# Patient Record
Sex: FEMALE | Race: WHITE | NOT HISPANIC OR LATINO | Employment: OTHER | URBAN - METROPOLITAN AREA
[De-identification: names, ages, dates, MRNs, and addresses within clinical notes are randomized per-mention and may not be internally consistent; named-entity substitution may affect disease eponyms.]

---

## 2023-09-18 PROBLEM — E11.40 TYPE 2 DIABETES MELLITUS WITH DIABETIC NEUROPATHY, WITHOUT LONG-TERM CURRENT USE OF INSULIN (H): Status: ACTIVE | Noted: 2018-03-04

## 2023-09-18 PROBLEM — S72.142A: Status: ACTIVE | Noted: 2023-09-09

## 2023-09-18 PROBLEM — S22.000A THORACIC COMPRESSION FRACTURE (H): Status: ACTIVE | Noted: 2023-01-01

## 2023-09-18 PROBLEM — R60.0 BILATERAL LOWER EXTREMITY EDEMA: Status: ACTIVE | Noted: 2019-09-19

## 2023-09-18 PROBLEM — R55 SITUATIONAL SYNCOPE: Status: ACTIVE | Noted: 2023-09-18

## 2023-09-18 PROBLEM — I25.10 CORONARY ATHEROSCLEROSIS: Status: ACTIVE | Noted: 2023-01-01

## 2023-09-18 PROBLEM — G90.3 NEUROGENIC ORTHOSTATIC HYPOTENSION (H): Status: ACTIVE | Noted: 2023-09-18

## 2023-09-18 PROBLEM — R29.898 SEVERE MUSCLE DECONDITIONING: Status: ACTIVE | Noted: 2023-09-18

## 2023-09-18 PROBLEM — G47.33 OSA TREATED WITH BIPAP: Status: ACTIVE | Noted: 2023-09-18

## 2023-09-18 PROBLEM — E66.9 OBESITY (BMI 30-39.9): Status: ACTIVE | Noted: 2023-09-18

## 2023-09-18 PROBLEM — S72.002A CLOSED LEFT HIP FRACTURE (H): Status: ACTIVE | Noted: 2023-09-09

## 2023-09-18 PROBLEM — T85.528A DISPLACEMENT OF PANCREATIC STENT: Status: ACTIVE | Noted: 2023-09-13

## 2023-09-18 PROBLEM — K58.1 IRRITABLE BOWEL SYNDROME WITH CONSTIPATION: Status: ACTIVE | Noted: 2023-09-18

## 2023-09-18 NOTE — PROGRESS NOTES
[facility] GERIATRICS  INITIAL VISIT NOTE  September 19, 2023    PRIMARY CARE PROVIDER AND CLINIC: MaicoCici [address] [facility] / [facility] [address]    [facility] Medical Record Number: [id number]  Place of Service where encounter took place: [facility] ([facility]) [[id number]]    Chief Complaint   Patient presents with    Hospital F/U     [facility] 9/9/2023 - 9/18/2023     HPI:    Malina Miller is a 89 year old (8/10/1932) female was admitted to the above facility from [facility] [place] . Hospital stay 9/9/23 through 9/18/23 where they were admitted for fall with left femur fracture. Now admitted to this facility for rehab, medical management, and nursing care.      History obtained from: facility chart records, facility staff, patient report, [facility] chart review, and Care Everywhere [facility] chart review.      Brief Hospital Course: PMH of hypothyroidism, neuropathy, DM2, RAJANI, CAD, HTN,  who presented after a fall. Found to have left intertrochanteric femur fracture. S/p nailing on 9/10/23. Had significant orthostatic hypotension, all Lasix, losartan, amlodipine stopped and she was started on midodrine. Narcotics stopped. Started on salt tabs for hyponatremia. Had episode of syncope after a BM, no further episodes. Had post-op anemia, was transfused 2 units PRBC and was started on Foltx. Glipizide discontinued. Was noted to be significantly deconditioned and TCU was recommended. When medically stable was discharged to U for further rehab and medical management.       TCU Course: Seen today resting in bed. She reports she has pain in her hip, but it is tolerable. She did not tolerate narcotics in the hospital. Is hoping she can discharge soon, but states she knows she is 89 and her  is 89, so she can't rush home. Admits they may need increased help at home. Her daughters are involved and supportive. She is currently using an EZ stand for transfers. Reports chronic issues with  "constipation. Denies any chest pain or SOB. Has not had any lightheadedness in TCU.       CODE STATUS/ADVANCE DIRECTIVES: DNR / DNI    ALLERGIES:  Allergies   Allergen Reactions    Metformin Diarrhea    Spironolactone Headache     Hyponatremia       PAST MEDICAL HISTORY:   No past medical history on file.  PAST SURGICAL HISTORY:   No past surgical history on file.  FAMILY HISTORY:   No family history on file.      SOCIAL HISTORY:   Patient's living condition: lives with spouse    MEDICATIONS  Post Discharge Medication Reconciliation Status: discharge medications reconciled and changed, per note/orders.  Current Outpatient Medications   Medication Sig Dispense Refill    acetaminophen (TYLENOL) 500 MG tablet Take 1,000 mg by mouth 3 times daily      aspirin 81 MG EC tablet Take 81 mg by mouth 2 times daily (with meals)      atorvastatin (LIPITOR) 10 MG tablet Take 10 mg by mouth daily      carboxymethylcellulose PF (REFRESH PLUS) 0.5 % ophthalmic solution Place 1 drop into both eyes 4 times daily as needed for dry eyes      Folic Acid-Vit B6-Vit B12 2.3-24.5-2 MG TABS Take 1 tablet by mouth daily      levothyroxine (SYNTHROID/LEVOTHROID) 112 MCG tablet Take 112 mcg by mouth daily      Lidocaine (LIDOCARE) 4 % Patch Place 1 patch onto the skin every 24 hours To prevent lidocaine toxicity, patient should be patch free for 12 hrs daily.      melatonin 3 MG tablet Take 3 mg by mouth At Bedtime      midodrine (PROAMATINE) 10 MG tablet Take 20 mg by mouth 2 times daily      pantoprazole (PROTONIX) 40 MG EC tablet Take 40 mg by mouth daily      sodium chloride 1 GM tablet Take 1 g by mouth 2 times daily       ROS:  10 point ROS neg other than the symptoms noted above in the HPI.      PHYSICAL EXAM:  /50   Pulse 61   Temp 97.8  F (36.6  C)   Resp 19   Ht 1.626 m (5' 4\")   SpO2 98%   Physical Exam  Cardiovascular:      Rate and Rhythm: Normal rate and regular rhythm.      Heart sounds: Normal heart sounds. "   Pulmonary:      Effort: Pulmonary effort is normal.      Breath sounds: Normal breath sounds.   Musculoskeletal:      Left lower leg: Edema present.      Comments: Decreased ROM to left hip,    Neurological:      Mental Status: She is alert and oriented to person, place, and time.   Psychiatric:         Mood and Affect: Mood normal.         Thought Content: Thought content normal.          LABORATORY/IMAGING DATA:  Reviewed as per [facility] and/or [facility]    ASSESSMENT/PLAN:  Aftercare following surgery of the musculoskeletal system  Closed 2-part intertrochanteric fracture of left femur with routine healing, subsequent encounter  Physical deconditioning  Secondary to fall. Surgery without complication. Did not tolerate narcotics as they caused confusion. Reports pain as tolerable on visit today.  Discussed with patient, nursing staff, therapy  - analgesia with APAP TID, lidocaine patches  - PT/OT  - DVT ppx with ASA 81mg BID x 6 weeks   - follow-up with ortho in 2 weeks     ABLA (acute blood loss anemia)  Hgb trace 6.6. Received 2 units PRBC, hgb stable ~9,  No s/s of bleeding in TCU  - Hgb PRN    Neurogenic orthostatic hypotension (H)  Orthostatic hypotension  Primary hypertension  Significant, amlodipine, lasix, and losartan discontinued. Limited data to trend BP in TCU.  Discussed with patient, nursing staff.   - continue midodrine 20mg BID   - monitor and adjust       Pure hypercholesterolemia  - continue atorvastatin    Irritable bowel syndrome with constipation  Chronic issues with constipation, manages with fiber at home, would like to try fiber supplement in TCU.   - psyllium (METAMUCIL/KONSYL) 58.6 % powder; Take 6 g (1 teaspoonful) by mouth daily    Hypothyroidism, unspecified type  TSH 0.22,   - continue levothyroxine    Type 2 diabetes mellitus with diabetic neuropathy, without long-term current use of insulin (H)  A1C 5.5% 7/2023. Glipizide stopped while IP.   - monitor BG   - daily BG checks      RAJANI treated with BiPAP  - CPAP at home settings.     Peripheral polyneuropathy  - APAP,   - monitor and adjust       Orders:   Psyllium powder 1 teaspoonful po daily for constipation  BMP on 9/25     Total time spent with patient visit at the skilled nursing facility was 50 minutes including patient visit and review of past records. Total time spent reviewing records from hospitalization outside my organization including review of labs and imaging reports , review of TCU facility records, medication reconciliation discussion of plan of care with nursing staff and therapy, time spent on documentation as well as discussion with patient including review of medications, discussion of plan of care and patient education as stated above. Tolerated standing during therapy today, but did fatigue easily.       Electronically signed by:  MARISOL Dean CNP

## 2023-09-19 ENCOUNTER — TRANSITIONAL CARE UNIT VISIT (OUTPATIENT)
Dept: GERIATRICS | Facility: CLINIC | Age: 88
End: 2023-09-19
Payer: MEDICARE

## 2023-09-19 VITALS
HEART RATE: 61 BPM | OXYGEN SATURATION: 98 % | SYSTOLIC BLOOD PRESSURE: 135 MMHG | HEIGHT: 64 IN | RESPIRATION RATE: 19 BRPM | DIASTOLIC BLOOD PRESSURE: 50 MMHG | TEMPERATURE: 97.8 F

## 2023-09-19 DIAGNOSIS — G90.3 NEUROGENIC ORTHOSTATIC HYPOTENSION (H): ICD-10-CM

## 2023-09-19 DIAGNOSIS — K58.1 IRRITABLE BOWEL SYNDROME WITH CONSTIPATION: ICD-10-CM

## 2023-09-19 DIAGNOSIS — R53.81 PHYSICAL DECONDITIONING: ICD-10-CM

## 2023-09-19 DIAGNOSIS — E11.40 TYPE 2 DIABETES MELLITUS WITH DIABETIC NEUROPATHY, WITHOUT LONG-TERM CURRENT USE OF INSULIN (H): ICD-10-CM

## 2023-09-19 DIAGNOSIS — I95.1 ORTHOSTATIC HYPOTENSION: ICD-10-CM

## 2023-09-19 DIAGNOSIS — Z47.89 AFTERCARE FOLLOWING SURGERY OF THE MUSCULOSKELETAL SYSTEM: Primary | ICD-10-CM

## 2023-09-19 DIAGNOSIS — I10 PRIMARY HYPERTENSION: ICD-10-CM

## 2023-09-19 DIAGNOSIS — E03.9 HYPOTHYROIDISM, UNSPECIFIED TYPE: ICD-10-CM

## 2023-09-19 DIAGNOSIS — E78.00 PURE HYPERCHOLESTEROLEMIA: ICD-10-CM

## 2023-09-19 DIAGNOSIS — G47.33 OSA TREATED WITH BIPAP: ICD-10-CM

## 2023-09-19 DIAGNOSIS — S72.142D CLOSED 2-PART INTERTROCHANTERIC FRACTURE OF LEFT FEMUR WITH ROUTINE HEALING, SUBSEQUENT ENCOUNTER: ICD-10-CM

## 2023-09-19 DIAGNOSIS — G62.9 PERIPHERAL POLYNEUROPATHY: ICD-10-CM

## 2023-09-19 DIAGNOSIS — D62 ABLA (ACUTE BLOOD LOSS ANEMIA): ICD-10-CM

## 2023-09-19 PROCEDURE — 99310 SBSQ NF CARE HIGH MDM 45: CPT | Performed by: NURSE PRACTITIONER

## 2023-09-19 RX ORDER — MIDODRINE HYDROCHLORIDE 10 MG/1
5 TABLET ORAL 2 TIMES DAILY
COMMUNITY
Start: 2023-09-19 | End: 2023-10-24

## 2023-09-19 RX ORDER — PANTOPRAZOLE SODIUM 40 MG/1
40 TABLET, DELAYED RELEASE ORAL DAILY
COMMUNITY
Start: 2023-09-19

## 2023-09-19 RX ORDER — CARBOXYMETHYLCELLULOSE SODIUM 5 MG/ML
1 SOLUTION/ DROPS OPHTHALMIC 4 TIMES DAILY PRN
COMMUNITY
Start: 2023-09-19

## 2023-09-19 RX ORDER — LIDOCAINE 4 G/G
1 PATCH TOPICAL EVERY 24 HOURS
COMMUNITY
Start: 2023-09-19 | End: 2023-11-07

## 2023-09-19 RX ORDER — ATORVASTATIN CALCIUM 10 MG/1
10 TABLET, FILM COATED ORAL DAILY
COMMUNITY
Start: 2023-09-19

## 2023-09-19 RX ORDER — ASPIRIN 81 MG/1
81 TABLET ORAL 2 TIMES DAILY WITH MEALS
COMMUNITY
Start: 2023-09-19 | End: 2023-11-07

## 2023-09-19 RX ORDER — LANOLIN ALCOHOL/MO/W.PET/CERES
3 CREAM (GRAM) TOPICAL AT BEDTIME
COMMUNITY
Start: 2023-09-19

## 2023-09-19 RX ORDER — LEVOTHYROXINE SODIUM 112 UG/1
112 TABLET ORAL DAILY
COMMUNITY
Start: 2023-09-19

## 2023-09-19 RX ORDER — SODIUM CHLORIDE 1 G/1
1 TABLET ORAL 2 TIMES DAILY
COMMUNITY
Start: 2023-09-19

## 2023-09-19 RX ORDER — ACETAMINOPHEN 500 MG
1000 TABLET ORAL 3 TIMES DAILY
COMMUNITY
Start: 2023-09-19

## 2023-09-19 NOTE — LETTER
9/19/2023        RE: Malina Miller  [address]  [place] [address]        [facility] GERIATRICS  INITIAL VISIT NOTE  September 19, 2023    PRIMARY CARE PROVIDER AND CLINIC: Cici [address] [facility] [id number]    [facility] Medical Record Number: [id number]  Place of Service where encounter took place: [facility] ([facility]) [[id number]]    Chief Complaint   Patient presents with     Hospital F/U     [facility] 9/9/2023 - 9/18/2023     HPI:    Malina Miller is a 89 year old (8/10/1932) female was admitted to the above facility from [facility] . Hospital stay 9/9/23 through 9/18/23 where they were admitted for fall with left femur fracture. Now admitted to this facility for rehab, medical management, and nursing care.      History obtained from: facility chart records, facility staff, patient report, [facility] chart review, and Care Everywhere [facility] chart review.      Brief Hospital Course: PMH of hypothyroidism, neuropathy, DM2, RAJANI, CAD, HTN,  who presented after a fall. Found to have left intertrochanteric femur fracture. S/p nailing on 9/10/23. Had significant orthostatic hypotension, all Lasix, losartan, amlodipine stopped and she was started on midodrine. Narcotics stopped. Started on salt tabs for hyponatremia. Had episode of syncope after a BM, no further episodes. Had post-op anemia, was transfused 2 units PRBC and was started on Foltx. Glipizide discontinued. Was noted to be significantly deconditioned and TCU was recommended. When medically stable was discharged to U for further rehab and medical management.       TCU Course: Seen today resting in bed. She reports she has pain in her hip, but it is tolerable. She did not tolerate narcotics in the hospital. Is hoping she can discharge soon, but states she knows she is 89 and her  is 89, so she can't rush home. Admits they may need increased help at home. Her daughters are involved  "and supportive. She is currently using an EZ stand for transfers. Reports chronic issues with constipation. Denies any chest pain or SOB. Has not had any lightheadedness in TCU.       CODE STATUS/ADVANCE DIRECTIVES: DNR / DNI    ALLERGIES:  Allergies   Allergen Reactions     Metformin Diarrhea     Spironolactone Headache     Hyponatremia       PAST MEDICAL HISTORY:   No past medical history on file.  PAST SURGICAL HISTORY:   No past surgical history on file.  FAMILY HISTORY:   No family history on file.      SOCIAL HISTORY:   Patient's living condition: lives with spouse    MEDICATIONS  Post Discharge Medication Reconciliation Status: discharge medications reconciled and changed, per note/orders.  Current Outpatient Medications   Medication Sig Dispense Refill     acetaminophen (TYLENOL) 500 MG tablet Take 1,000 mg by mouth 3 times daily       aspirin 81 MG EC tablet Take 81 mg by mouth 2 times daily (with meals)       atorvastatin (LIPITOR) 10 MG tablet Take 10 mg by mouth daily       carboxymethylcellulose PF (REFRESH PLUS) 0.5 % ophthalmic solution Place 1 drop into both eyes 4 times daily as needed for dry eyes       Folic Acid-Vit B6-Vit B12 2.3-24.5-2 MG TABS Take 1 tablet by mouth daily       levothyroxine (SYNTHROID/LEVOTHROID) 112 MCG tablet Take 112 mcg by mouth daily       Lidocaine (LIDOCARE) 4 % Patch Place 1 patch onto the skin every 24 hours To prevent lidocaine toxicity, patient should be patch free for 12 hrs daily.       melatonin 3 MG tablet Take 3 mg by mouth At Bedtime       midodrine (PROAMATINE) 10 MG tablet Take 20 mg by mouth 2 times daily       pantoprazole (PROTONIX) 40 MG EC tablet Take 40 mg by mouth daily       sodium chloride 1 GM tablet Take 1 g by mouth 2 times daily       ROS:  10 point ROS neg other than the symptoms noted above in the HPI.      PHYSICAL EXAM:  /50   Pulse 61   Temp 97.8  F (36.6  C)   Resp 19   Ht 1.626 m (5' 4\")   SpO2 98%   Physical " Exam  Cardiovascular:      Rate and Rhythm: Normal rate and regular rhythm.      Heart sounds: Normal heart sounds.   Pulmonary:      Effort: Pulmonary effort is normal.      Breath sounds: Normal breath sounds.   Musculoskeletal:      Left lower leg: Edema present.      Comments: Decreased ROM to left hip,    Neurological:      Mental Status: She is alert and oriented to person, place, and time.   Psychiatric:         Mood and Affect: Mood normal.         Thought Content: Thought content normal.          LABORATORY/IMAGING DATA:  Reviewed as per [facility] and/or [facility]    ASSESSMENT/PLAN:  Aftercare following surgery of the musculoskeletal system  Closed 2-part intertrochanteric fracture of left femur with routine healing, subsequent encounter  Physical deconditioning  Secondary to fall. Surgery without complication. Did not tolerate narcotics as they caused confusion. Reports pain as tolerable on visit today.  Discussed with patient, nursing staff, therapy  - analgesia with APAP TID, lidocaine patches  - PT/OT  - DVT ppx with ASA 81mg BID x 6 weeks   - follow-up with ortho in 2 weeks     ABLA (acute blood loss anemia)  Hgb trace 6.6. Received 2 units PRBC, hgb stable ~9,  No s/s of bleeding in TCU  - Hgb PRN    Neurogenic orthostatic hypotension (H)  Orthostatic hypotension  Primary hypertension  Significant, amlodipine, lasix, and losartan discontinued. Limited data to trend BP in TCU.  Discussed with patient, nursing staff.   - continue midodrine 20mg BID   - monitor and adjust       Pure hypercholesterolemia  - continue atorvastatin    Irritable bowel syndrome with constipation  Chronic issues with constipation, manages with fiber at home, would like to try fiber supplement in TCU.   - psyllium (METAMUCIL/KONSYL) 58.6 % powder; Take 6 g (1 teaspoonful) by mouth daily    Hypothyroidism, unspecified type  TSH 0.22,   - continue levothyroxine    Type 2 diabetes mellitus with diabetic neuropathy, without  long-term current use of insulin (H)  A1C 5.5% 7/2023. Glipizide stopped while IP.   - monitor BG   - daily BG checks     RAJANI treated with BiPAP  - CPAP at home settings.     Peripheral polyneuropathy  - APAP,   - monitor and adjust       Orders:   Psyllium powder 1 teaspoonful po daily for constipation  BMP on 9/25     Total time spent with patient visit at the skilled nursing facility was 50 minutes including patient visit and review of past records. Total time spent reviewing records from hospitalization outside my organization including review of labs and imaging reports , review of TCU facility records, medication reconciliation discussion of plan of care with nursing staff and therapy, time spent on documentation as well as discussion with patient including review of medications, discussion of plan of care and patient education as stated above. Tolerated standing during therapy today, but did fatigue easily.       Electronically signed by:  MARISOL Dean CNP       Sincerely,        MARISOL Dean CNP

## 2023-09-24 ENCOUNTER — LAB REQUISITION (OUTPATIENT)
Dept: LAB | Facility: CLINIC | Age: 88
End: 2023-09-24

## 2023-09-24 DIAGNOSIS — I95.1 ORTHOSTATIC HYPOTENSION: ICD-10-CM

## 2023-09-25 ENCOUNTER — TELEPHONE (OUTPATIENT)
Dept: GERIATRICS | Facility: CLINIC | Age: 88
End: 2023-09-25

## 2023-09-25 LAB
ANION GAP SERPL CALCULATED.3IONS-SCNC: 14 MMOL/L (ref 7–15)
BUN SERPL-MCNC: 10 MG/DL (ref 8–23)
CALCIUM SERPL-MCNC: 9.1 MG/DL (ref 8.2–9.6)
CHLORIDE SERPL-SCNC: 99 MMOL/L (ref 98–107)
CREAT SERPL-MCNC: 0.59 MG/DL (ref 0.51–0.95)
DEPRECATED HCO3 PLAS-SCNC: 24 MMOL/L (ref 22–29)
EGFRCR SERPLBLD CKD-EPI 2021: 85 ML/MIN/1.73M2
GLUCOSE SERPL-MCNC: 127 MG/DL (ref 70–99)
POTASSIUM SERPL-SCNC: 3.2 MMOL/L (ref 3.4–5.3)
SODIUM SERPL-SCNC: 137 MMOL/L (ref 136–145)

## 2023-09-25 PROCEDURE — 80048 BASIC METABOLIC PNL TOTAL CA: CPT | Performed by: NURSE PRACTITIONER

## 2023-09-25 PROCEDURE — 36415 COLL VENOUS BLD VENIPUNCTURE: CPT | Performed by: NURSE PRACTITIONER

## 2023-09-25 PROCEDURE — P9603 ONE-WAY ALLOW PRORATED MILES: HCPCS | Performed by: NURSE PRACTITIONER

## 2023-09-25 NOTE — PROGRESS NOTES
[facility] GERIATRICS  ACUTE/EPISODIC VISIT    [facility] Medical Record Number: [id number]  Place of Service where encounter took place: [facility] ([facility]) [[id number]]    Chief Complaint   Patient presents with    RECHECK     HPI:    Malina Miller is a 89 year old (8/10/1932), who is being seen today for an episodic care visit. HPI information obtained from: facility chart records, facility staff, patient report, and [facility] chart review.    Today's concern is: Recently hospitalized after fall due to orthostasis with left hip fracture, s/p ORIF. Has been having significant pain with ambulation which limiting progress.. Narcotics were stopped while IP as concern were contributing to orthostasis. She is having bowel movements. Denies any lightheadedness in TCU. Appetite has been good. She lives at home with her , was ambulatory without device prior to fall.     ALLERGIES:   Allergies   Allergen Reactions    Metformin Diarrhea    Spironolactone Headache     Hyponatremia      MEDICATIONS:  Post Discharge Medication Reconciliation Status: medication reconcilation previously completed during another office visit.     Current Outpatient Medications   Medication Sig Dispense Refill    traMADol (ULTRAM) 50 MG tablet Take 0.5 tablets (25 mg) by mouth every 6 hours as needed for severe pain Ok to pull 1 tab from E-kit 12 tablet 0    acetaminophen (TYLENOL) 500 MG tablet Take 1,000 mg by mouth 3 times daily      aspirin 81 MG EC tablet Take 81 mg by mouth 2 times daily (with meals)      atorvastatin (LIPITOR) 10 MG tablet Take 10 mg by mouth daily      carboxymethylcellulose PF (REFRESH PLUS) 0.5 % ophthalmic solution Place 1 drop into both eyes 4 times daily as needed for dry eyes      Folic Acid-Vit B6-Vit B12 2.3-24.5-2 MG TABS Take 1 tablet by mouth daily      levothyroxine (SYNTHROID/LEVOTHROID) 112 MCG tablet Take 112 mcg by mouth daily      Lidocaine (LIDOCARE) 4 % Patch Place 1  "patch onto the skin every 24 hours To prevent lidocaine toxicity, patient should be patch free for 12 hrs daily.      melatonin 3 MG tablet Take 3 mg by mouth At Bedtime      midodrine (PROAMATINE) 10 MG tablet Take 20 mg by mouth 2 times daily      pantoprazole (PROTONIX) 40 MG EC tablet Take 40 mg by mouth daily      psyllium (METAMUCIL/KONSYL) 58.6 % powder Take 6 g (1 teaspoonful) by mouth daily      sodium chloride 1 GM tablet Take 1 g by mouth 2 times daily       Medications reviewed:  Medications reconciled to facility chart and changes were made to reflect current medications as identified as above med list. Below are the changes that were made:   Medications stopped since last EPIC medication reconciliation:   There are no discontinued medications.    Medications started since last [facility] medication reconciliation:  No orders of the defined types were placed in this encounter.        REVIEW OF SYSTEMS:  4 point ROS neg other than the symptoms noted above in the HPI.      PHYSICAL EXAM:  BP (!) 176/68   Pulse 61   Temp 97.6  F (36.4  C)   Resp 20   Ht 1.626 m (5' 4\")   Wt 81.6 kg (180 lb)   SpO2 98%   BMI 30.90 kg/m    Physical Exam  Cardiovascular:      Rate and Rhythm: Normal rate and regular rhythm.      Heart sounds: Normal heart sounds.   Pulmonary:      Effort: Pulmonary effort is normal.      Breath sounds: Normal breath sounds.   Abdominal:      General: Bowel sounds are normal.   Musculoskeletal:      Right lower leg: Edema present.      Left lower leg: Edema present.      Comments: Decreased ROM to left hip   Neurological:      Mental Status: She is alert and oriented to person, place, and time.   Psychiatric:         Mood and Affect: Mood normal.         Thought Content: Thought content normal.         ASSESSMENT / PLAN:  Aftercare following surgery of the musculoskeletal system  Closed 2-part intertrochanteric fracture of left femur with routine healing, subsequent encounter  Physical " deconditioning  Secondary to fall. Surgery without complication. Did not tolerate narcotics as they caused confusion and orthostasis. Has been having severe pain in TCU, especially with ambulation. Discussed with pharmacist, as was using oxycodone while IP but may better tolerate a different narcotic such as low dose tramadol  - traMADol (ULTRAM) 50 MG tablet; Take 0.5 tablets (25 mg) by mouth every 6 hours as needed for severe pain Ok to pull 1 tab from E-kit  - continue APAP 1000mg TID, lidocaine patches  - PT/OT  - DVT ppx with ASA 81mg BID x 6 weeks   - follow-up with ortho 1-2 weeks     Orthostatic hypotension  Primary hypertension  Started on midodrine and amlodipine, lasix and losartan discontinued. No orthostasis in TCU. BP running 150-160, but suspect pain contributing  - continue midodrine 20mg BID  - monitor and adjust     Type 2 diabetes mellitus with diabetic neuropathy, without long-term current use of insulin (H)  A1C 5.5% 7/2023. Glipizide stopped while IP. Bg running 150-180  - monitor     Irritable bowel syndrome with constipation  Chronic issues with constipation, + BM.   - psyllium (METAMUCIL/KONSYL) 58.6 % powder; Take 6 g (1 teaspoonful) by mouth wanda      Orders:  Tramadol 25mg q 6h PRN for severe pain    Electronically signed by:  MARISOL Dean CNP

## 2023-09-25 NOTE — TELEPHONE ENCOUNTER
Orders relayed to facility nurse, Evon.      ----- Message from MARISOL Mac CNP sent at 9/25/2023  1:24 PM CDT -----  Let's d K-dur 20 meq Po x1 for hypokalemia    Thanks  Olinda

## 2023-09-26 ENCOUNTER — TRANSITIONAL CARE UNIT VISIT (OUTPATIENT)
Dept: GERIATRICS | Facility: CLINIC | Age: 88
End: 2023-09-26
Payer: MEDICARE

## 2023-09-26 VITALS
BODY MASS INDEX: 30.73 KG/M2 | SYSTOLIC BLOOD PRESSURE: 176 MMHG | OXYGEN SATURATION: 98 % | HEART RATE: 61 BPM | DIASTOLIC BLOOD PRESSURE: 68 MMHG | TEMPERATURE: 97.6 F | RESPIRATION RATE: 20 BRPM | WEIGHT: 180 LBS | HEIGHT: 64 IN

## 2023-09-26 DIAGNOSIS — R53.81 PHYSICAL DECONDITIONING: ICD-10-CM

## 2023-09-26 DIAGNOSIS — I95.1 ORTHOSTATIC HYPOTENSION: ICD-10-CM

## 2023-09-26 DIAGNOSIS — I10 PRIMARY HYPERTENSION: ICD-10-CM

## 2023-09-26 DIAGNOSIS — Z47.89 AFTERCARE FOLLOWING SURGERY OF THE MUSCULOSKELETAL SYSTEM: Primary | ICD-10-CM

## 2023-09-26 DIAGNOSIS — E11.40 TYPE 2 DIABETES MELLITUS WITH DIABETIC NEUROPATHY, WITHOUT LONG-TERM CURRENT USE OF INSULIN (H): ICD-10-CM

## 2023-09-26 DIAGNOSIS — K58.1 IRRITABLE BOWEL SYNDROME WITH CONSTIPATION: ICD-10-CM

## 2023-09-26 DIAGNOSIS — S72.142D CLOSED 2-PART INTERTROCHANTERIC FRACTURE OF LEFT FEMUR WITH ROUTINE HEALING, SUBSEQUENT ENCOUNTER: ICD-10-CM

## 2023-09-26 PROCEDURE — 99309 SBSQ NF CARE MODERATE MDM 30: CPT | Performed by: NURSE PRACTITIONER

## 2023-09-26 RX ORDER — TRAMADOL HYDROCHLORIDE 50 MG/1
25 TABLET ORAL EVERY 6 HOURS PRN
Qty: 12 TABLET | Refills: 0 | Status: SHIPPED | OUTPATIENT
Start: 2023-09-26 | End: 2023-10-24

## 2023-09-26 NOTE — LETTER
9/26/2023        RE: Malina Miller  [address] [address] [address]        [facility] GERIATRICS  ACUTE/EPISODIC VISIT    [facility] Medical Record Number: [id number]  Place of Service where encounter took place: [facility] ([facility]) [[id number]]    Chief Complaint   Patient presents with     RECHECK     HPI:    Malina Miller is a 89 year old (8/10/1932), who is being seen today for an episodic care visit. HPI information obtained from: facility chart records, facility staff, patient report, and [facility] chart review.    Today's concern is: Recently hospitalized after fall due to orthostasis with left hip fracture, s/p ORIF. Has been having significant pain with ambulation which limiting progress.. Narcotics were stopped while IP as concern were contributing to orthostasis. She is having bowel movements. Denies any lightheadedness in TCU. Appetite has been good. She lives at home with her , was ambulatory without device prior to fall.     ALLERGIES:   Allergies   Allergen Reactions     Metformin Diarrhea     Spironolactone Headache     Hyponatremia      MEDICATIONS:  Post Discharge Medication Reconciliation Status: medication reconcilation previously completed during another office visit.     Current Outpatient Medications   Medication Sig Dispense Refill     traMADol (ULTRAM) 50 MG tablet Take 0.5 tablets (25 mg) by mouth every 6 hours as needed for severe pain Ok to pull 1 tab from E-kit 12 tablet 0     acetaminophen (TYLENOL) 500 MG tablet Take 1,000 mg by mouth 3 times daily       aspirin 81 MG EC tablet Take 81 mg by mouth 2 times daily (with meals)       atorvastatin (LIPITOR) 10 MG tablet Take 10 mg by mouth daily       carboxymethylcellulose PF (REFRESH PLUS) 0.5 % ophthalmic solution Place 1 drop into both eyes 4 times daily as needed for dry eyes       Folic Acid-Vit B6-Vit B12 2.3-24.5-2 MG TABS Take 1 tablet by mouth daily       levothyroxine  "(SYNTHROID/LEVOTHROID) 112 MCG tablet Take 112 mcg by mouth daily       Lidocaine (LIDOCARE) 4 % Patch Place 1 patch onto the skin every 24 hours To prevent lidocaine toxicity, patient should be patch free for 12 hrs daily.       melatonin 3 MG tablet Take 3 mg by mouth At Bedtime       midodrine (PROAMATINE) 10 MG tablet Take 20 mg by mouth 2 times daily       pantoprazole (PROTONIX) 40 MG EC tablet Take 40 mg by mouth daily       psyllium (METAMUCIL/KONSYL) 58.6 % powder Take 6 g (1 teaspoonful) by mouth daily       sodium chloride 1 GM tablet Take 1 g by mouth 2 times daily       Medications reviewed:  Medications reconciled to facility chart and changes were made to reflect current medications as identified as above med list. Below are the changes that were made:   Medications stopped since last EPIC medication reconciliation:   There are no discontinued medications.    Medications started since last [facility] medication reconciliation:  No orders of the defined types were placed in this encounter.        REVIEW OF SYSTEMS:  4 point ROS neg other than the symptoms noted above in the HPI.      PHYSICAL EXAM:  BP (!) 176/68   Pulse 61   Temp 97.6  F (36.4  C)   Resp 20   Ht 1.626 m (5' 4\")   Wt 81.6 kg (180 lb)   SpO2 98%   BMI 30.90 kg/m    Physical Exam  Cardiovascular:      Rate and Rhythm: Normal rate and regular rhythm.      Heart sounds: Normal heart sounds.   Pulmonary:      Effort: Pulmonary effort is normal.      Breath sounds: Normal breath sounds.   Abdominal:      General: Bowel sounds are normal.   Musculoskeletal:      Right lower leg: Edema present.      Left lower leg: Edema present.      Comments: Decreased ROM to left hip   Neurological:      Mental Status: She is alert and oriented to person, place, and time.   Psychiatric:         Mood and Affect: Mood normal.         Thought Content: Thought content normal.         ASSESSMENT / PLAN:  Aftercare following surgery of the musculoskeletal " system  Closed 2-part intertrochanteric fracture of left femur with routine healing, subsequent encounter  Physical deconditioning  Secondary to fall. Surgery without complication. Did not tolerate narcotics as they caused confusion and orthostasis. Has been having severe pain in TCU, especially with ambulation. Discussed with pharmacist, as was using oxycodone while IP but may better tolerate a different narcotic such as low dose tramadol  - traMADol (ULTRAM) 50 MG tablet; Take 0.5 tablets (25 mg) by mouth every 6 hours as needed for severe pain Ok to pull 1 tab from E-kit  - continue APAP 1000mg TID, lidocaine patches  - PT/OT  - DVT ppx with ASA 81mg BID x 6 weeks   - follow-up with ortho 1-2 weeks     Orthostatic hypotension  Primary hypertension  Started on midodrine and amlodipine, lasix and losartan discontinued. No orthostasis in TCU. BP running 150-160, but suspect pain contributing  - continue midodrine 20mg BID  - monitor and adjust     Type 2 diabetes mellitus with diabetic neuropathy, without long-term current use of insulin (H)  A1C 5.5% 7/2023. Glipizide stopped while IP. Bg running 150-180  - monitor     Irritable bowel syndrome with constipation  Chronic issues with constipation, + BM.   - psyllium (METAMUCIL/KONSYL) 58.6 % powder; Take 6 g (1 teaspoonful) by mouth wanda      Orders:  Tramadol 25mg q 6h PRN for severe pain    Electronically signed by:  MARISOL Dean CNP      Sincerely,        MARISOL Dean CNP

## 2023-09-28 NOTE — PROGRESS NOTES
[facility] GERIATRICS  ACUTE/EPISODIC VISIT    [facility] Medical Record Number: [id number]  Place of Service where encounter took place: [facility] ([facility]) [[id number]]    Chief Complaint   Patient presents with    RECHECK     HPI:    Malina Miller is a 89 year old (8/10/1932), who is being seen today for an episodic care visit. HPI information obtained from: facility chart records, facility staff, patient report, and [facility] chart review.    Today's concern is: Recently hospitalized after fall due to orthostasis with left hip fracture, s/p ORIF. Was started on tramadol PRN for pain. She reports she had a lot of lightheadedness after taking it and does not want to take anymore. She has been getting E-stim in PT, and between that and Tylenol and Lidocaine patch. She reports some lightheadedness still when standing and walking with therapy, but is much better than it was in the hospital.     Some lightheadedness with ambulation  Tramadol caused lighthededness  Estim helpful for pain   Increased pain today after ortho follow-up at [facility] on 9/28, staples removed.     ALLERGIES:   Allergies   Allergen Reactions    Metformin Diarrhea    Spironolactone Headache     Hyponatremia      MEDICATIONS:  Post Discharge Medication Reconciliation Status: medication reconcilation previously completed during another office visit.     Current Outpatient Medications   Medication Sig Dispense Refill    acetaminophen (TYLENOL) 500 MG tablet Take 1,000 mg by mouth 3 times daily      aspirin 81 MG EC tablet Take 81 mg by mouth 2 times daily (with meals)      atorvastatin (LIPITOR) 10 MG tablet Take 10 mg by mouth daily      carboxymethylcellulose PF (REFRESH PLUS) 0.5 % ophthalmic solution Place 1 drop into both eyes 4 times daily as needed for dry eyes      Folic Acid-Vit B6-Vit B12 2.3-24.5-2 MG TABS Take 1 tablet by mouth daily      levothyroxine (SYNTHROID/LEVOTHROID) 112 MCG tablet Take 112 mcg by mouth  "daily      Lidocaine (LIDOCARE) 4 % Patch Place 1 patch onto the skin every 24 hours To prevent lidocaine toxicity, patient should be patch free for 12 hrs daily.      melatonin 3 MG tablet Take 3 mg by mouth At Bedtime      midodrine (PROAMATINE) 10 MG tablet Take 20 mg by mouth 2 times daily      pantoprazole (PROTONIX) 40 MG EC tablet Take 40 mg by mouth daily      psyllium (METAMUCIL/KONSYL) 58.6 % powder Take 6 g (1 teaspoonful) by mouth daily      sodium chloride 1 GM tablet Take 1 g by mouth 2 times daily      traMADol (ULTRAM) 50 MG tablet Take 0.5 tablets (25 mg) by mouth every 6 hours as needed for severe pain Ok to pull 1 tab from E-kit 12 tablet 0     Medications reviewed:  Medications reconciled to facility chart and changes were made to reflect current medications as identified as above med list. Below are the changes that were made:   Medications stopped since last EPIC medication reconciliation:   There are no discontinued medications.    Medications started since last [facility] medication reconciliation:  No orders of the defined types were placed in this encounter.        REVIEW OF SYSTEMS:  4 point ROS neg other than the symptoms noted above in the HPI.      PHYSICAL EXAM:  BP (!) 148/72   Pulse 60   Temp 98  F (36.7  C)   Resp 20   Ht 1.626 m (5' 4\")   Wt 84.4 kg (186 lb)   SpO2 96%   BMI 31.93 kg/m    Physical Exam  Cardiovascular:      Rate and Rhythm: Normal rate and regular rhythm.      Heart sounds: Normal heart sounds.   Pulmonary:      Effort: Pulmonary effort is normal.      Breath sounds: Normal breath sounds.   Neurological:      Mental Status: She is alert.   Psychiatric:         Mood and Affect: Mood normal.         Thought Content: Thought content normal.         ASSESSMENT / PLAN:  Aftercare following surgery of the musculoskeletal system  Closed 2-part intertrochanteric fracture of left femur with routine healing, subsequent encounter  Physical deconditioning  Secondary to fall. " Surgery without complication. Did not tolerate narcotics as they caused confusion and orthostasis. Tried low dose tramadol in TCU due to severe pain, again caused lightheadedness. Has been getting E-stim which she feels is effective. Had follow-up with ortho on 9/28, staples removed.   - tDC tramadol  - continue APAP 1000mg TID, lidocaine patches  - PT/OT, E-stim   - DVT ppx with ASA 81mg BID x 6 weeks   - follow-up with ortho    Orthostatic hypotension  Primary hypertension  Started on midodrine; amlodipine, lasix and losartan discontinued while IP. Has had elevated BP in -170, however, continues to have have lightheadedness with standing and ambulation so do not feel comfortable attempting dose reduction of midodrine  - midodrine 20mg BID   - continue to monitor.       Orders:  Discontinue tramadol    Electronically signed by:  MARISOL Dean CNP

## 2023-09-29 ENCOUNTER — TRANSITIONAL CARE UNIT VISIT (OUTPATIENT)
Dept: GERIATRICS | Facility: CLINIC | Age: 88
End: 2023-09-29
Payer: MEDICARE

## 2023-09-29 VITALS
SYSTOLIC BLOOD PRESSURE: 148 MMHG | BODY MASS INDEX: 31.76 KG/M2 | HEART RATE: 60 BPM | TEMPERATURE: 98 F | RESPIRATION RATE: 20 BRPM | DIASTOLIC BLOOD PRESSURE: 72 MMHG | HEIGHT: 64 IN | WEIGHT: 186 LBS | OXYGEN SATURATION: 96 %

## 2023-09-29 DIAGNOSIS — I10 PRIMARY HYPERTENSION: ICD-10-CM

## 2023-09-29 DIAGNOSIS — I95.1 ORTHOSTATIC HYPOTENSION: ICD-10-CM

## 2023-09-29 DIAGNOSIS — Z47.89 AFTERCARE FOLLOWING SURGERY OF THE MUSCULOSKELETAL SYSTEM: Primary | ICD-10-CM

## 2023-09-29 DIAGNOSIS — R53.81 PHYSICAL DECONDITIONING: ICD-10-CM

## 2023-09-29 DIAGNOSIS — S72.142D CLOSED 2-PART INTERTROCHANTERIC FRACTURE OF LEFT FEMUR WITH ROUTINE HEALING, SUBSEQUENT ENCOUNTER: ICD-10-CM

## 2023-09-29 PROCEDURE — 99309 SBSQ NF CARE MODERATE MDM 30: CPT | Performed by: NURSE PRACTITIONER

## 2023-09-29 NOTE — LETTER
9/29/2023        RE: Malina Miller  [address] [facility] [address]        [facility] GERIATRICS  ACUTE/EPISODIC VISIT    [facility] Medical Record Number: [id number]  Place of Service where encounter took place: [facility] ([facility]) [[id number]]    Chief Complaint   Patient presents with     RECHECK     HPI:    Malina Miller is a 89 year old (8/10/1932), who is being seen today for an episodic care visit. HPI information obtained from: facility chart records, facility staff, patient report, and [facility] chart review.    Today's concern is: Recently hospitalized after fall due to orthostasis with left hip fracture, s/p ORIF. Was started on tramadol PRN for pain. She reports she had a lot of lightheadedness after taking it and does not want to take anymore. She has been getting E-stim in PT, and between that and Tylenol and Lidocaine patch. She reports some lightheadedness still when standing and walking with therapy, but is much better than it was in the hospital.     Some lightheadedness with ambulation  Tramadol caused lighthededness  Estim helpful for pain   Increased pain today after ortho follow-up at [facility] on 9/28, staples removed.     ALLERGIES:   Allergies   Allergen Reactions     Metformin Diarrhea     Spironolactone Headache     Hyponatremia      MEDICATIONS:  Post Discharge Medication Reconciliation Status: medication reconcilation previously completed during another office visit.     Current Outpatient Medications   Medication Sig Dispense Refill     acetaminophen (TYLENOL) 500 MG tablet Take 1,000 mg by mouth 3 times daily       aspirin 81 MG EC tablet Take 81 mg by mouth 2 times daily (with meals)       atorvastatin (LIPITOR) 10 MG tablet Take 10 mg by mouth daily       carboxymethylcellulose PF (REFRESH PLUS) 0.5 % ophthalmic solution Place 1 drop into both eyes 4 times daily as needed for dry eyes       Folic Acid-Vit B6-Vit B12 2.3-24.5-2 MG TABS  "Take 1 tablet by mouth daily       levothyroxine (SYNTHROID/LEVOTHROID) 112 MCG tablet Take 112 mcg by mouth daily       Lidocaine (LIDOCARE) 4 % Patch Place 1 patch onto the skin every 24 hours To prevent lidocaine toxicity, patient should be patch free for 12 hrs daily.       melatonin 3 MG tablet Take 3 mg by mouth At Bedtime       midodrine (PROAMATINE) 10 MG tablet Take 20 mg by mouth 2 times daily       pantoprazole (PROTONIX) 40 MG EC tablet Take 40 mg by mouth daily       psyllium (METAMUCIL/KONSYL) 58.6 % powder Take 6 g (1 teaspoonful) by mouth daily       sodium chloride 1 GM tablet Take 1 g by mouth 2 times daily       traMADol (ULTRAM) 50 MG tablet Take 0.5 tablets (25 mg) by mouth every 6 hours as needed for severe pain Ok to pull 1 tab from E-kit 12 tablet 0     Medications reviewed:  Medications reconciled to facility chart and changes were made to reflect current medications as identified as above med list. Below are the changes that were made:   Medications stopped since last EPIC medication reconciliation:   There are no discontinued medications.    Medications started since last [facility] medication reconciliation:  No orders of the defined types were placed in this encounter.        REVIEW OF SYSTEMS:  4 point ROS neg other than the symptoms noted above in the HPI.      PHYSICAL EXAM:  BP (!) 148/72   Pulse 60   Temp 98  F (36.7  C)   Resp 20   Ht 1.626 m (5' 4\")   Wt 84.4 kg (186 lb)   SpO2 96%   BMI 31.93 kg/m    Physical Exam  Cardiovascular:      Rate and Rhythm: Normal rate and regular rhythm.      Heart sounds: Normal heart sounds.   Pulmonary:      Effort: Pulmonary effort is normal.      Breath sounds: Normal breath sounds.   Neurological:      Mental Status: She is alert.   Psychiatric:         Mood and Affect: Mood normal.         Thought Content: Thought content normal.         ASSESSMENT / PLAN:  Aftercare following surgery of the musculoskeletal system  Closed 2-part " intertrochanteric fracture of left femur with routine healing, subsequent encounter  Physical deconditioning  Secondary to fall. Surgery without complication. Did not tolerate narcotics as they caused confusion and orthostasis. Tried low dose tramadol in TCU due to severe pain, again caused lightheadedness. Has been getting E-stim which she feels is effective. Had follow-up with ortho on 9/28, staples removed.   - tDC tramadol  - continue APAP 1000mg TID, lidocaine patches  - PT/OT, E-stim   - DVT ppx with ASA 81mg BID x 6 weeks   - follow-up with ortho    Orthostatic hypotension  Primary hypertension  Started on midodrine; amlodipine, lasix and losartan discontinued while IP. Has had elevated BP in -170, however, continues to have have lightheadedness with standing and ambulation so do not feel comfortable attempting dose reduction of midodrine  - midodrine 20mg BID   - continue to monitor.       Orders:  Discontinue tramadol    Electronically signed by:  MARISOL Dean CNP      Sincerely,        MARISOL Dean CNP

## 2023-10-03 NOTE — PROGRESS NOTES
"[facility] GERIATRICS  Primary Care Provider & Clinic: Cici Nina, [address] [facility] [address]  Chief Complaint   Patient presents with    Hospital F/U     [facility] 9/9/2023 - 9/18/2023     [facility] Medical Record Number: [id number]  Place of Service Where Encounter Took Place: [facility] ([facility]) [[id number]]    Malina lopez is a 89 year old (8/10/1932), admitted to the above facility from  [facility] . Hospital stay 9/9/23 through 9/18/23.    HPI:    As per GNP's note:\"Brief Hospital Course: PMH of hypothyroidism, neuropathy, DM2, RAJANI, CAD, HTN,  who presented after a fall. Found to have left intertrochanteric femur fracture. S/p nailing on 9/10/23. Had significant orthostatic hypotension, all Lasix, losartan, amlodipine stopped and she was started on midodrine. Narcotics stopped. Started on salt tabs for hyponatremia. Had episode of syncope after a BM, no further episodes. Had post-op anemia, was transfused 2 units PRBC and was started on Foltx. Glipizide discontinued. Was noted to be significantly deconditioned and TCU was recommended. When medically stable was discharged to TCU for further rehab and medical management.  \"      TODAY:  -COVID19: On 10/3. Reports started having cough two days ago, could not bring up the phlegm. Denies SOB, no fever or chills, no sore throat, n/v/diarrhea, no muscles or joints [ain. RN reports develped fever today.   - Lt IT fx : doing well,  aggravated with exercise, otherwise no concern. Better with meds.   - ABLA: appetite is fine.   - Rehab: going really good.   - OH: denies feeling dizzy when stands up    =================================================================    CODE STATUS/ADVANCE DIRECTIVES DISCUSSION: No Order -   Patient's living condition: lives with spouse  ALLERGIES:   Allergies   Allergen Reactions    Metformin Diarrhea    Spironolactone Headache     Hyponatremia      PAST MEDICAL HISTORY: No past medical history on file. " "  PAST SURGICAL HISTORY:  has no past surgical history on file.  FAMILY HISTORY: family history is not on file.  SOCIAL HISTORY:      Post Discharge Medication Reconciliation Status:  MED REC REQUIRED  Post Medication Reconciliation Status: discharge medications reconciled and changed, per note/orders    Current Outpatient Medications   Medication Sig    acetaminophen (TYLENOL) 500 MG tablet Take 1,000 mg by mouth 3 times daily    aspirin 81 MG EC tablet Take 81 mg by mouth 2 times daily (with meals)    atorvastatin (LIPITOR) 10 MG tablet Take 10 mg by mouth daily    carboxymethylcellulose PF (REFRESH PLUS) 0.5 % ophthalmic solution Place 1 drop into both eyes 4 times daily as needed for dry eyes    Folic Acid-Vit B6-Vit B12 2.3-24.5-2 MG TABS Take 1 tablet by mouth daily    levothyroxine (SYNTHROID/LEVOTHROID) 112 MCG tablet Take 112 mcg by mouth daily    Lidocaine (LIDOCARE) 4 % Patch Place 1 patch onto the skin every 24 hours To prevent lidocaine toxicity, patient should be patch free for 12 hrs daily.    melatonin 3 MG tablet Take 3 mg by mouth At Bedtime    midodrine (PROAMATINE) 10 MG tablet Take 20 mg by mouth 2 times daily    pantoprazole (PROTONIX) 40 MG EC tablet Take 40 mg by mouth daily    psyllium (METAMUCIL/KONSYL) 58.6 % powder Take 6 g (1 teaspoonful) by mouth daily    sodium chloride 1 GM tablet Take 1 g by mouth 2 times daily    traMADol (ULTRAM) 50 MG tablet Take 0.5 tablets (25 mg) by mouth every 6 hours as needed for severe pain Ok to pull 1 tab from E-kit     No current facility-administered medications for this visit.     ROS:  10 point ROS of systems including Constitutional, Eyes, Respiratory, Cardiovascular, Gastroenterology, Genitourinary, Integumentary, Musculoskeletal, Psychiatric were all negative except for pertinent positives noted in my HPI.    Vitals:  /70   Pulse 72   Temp 97.8  F (36.6  C)   Resp 18   Ht 1.626 m (5' 4\")   Wt 82.8 kg (182 lb 9.6 oz)   SpO2 96%   BMI " 31.34 kg/m    Exam:  GENERAL APPEARANCE:  in no distress,   RESP:  congested lung during cough  CV:  S1S2 audible, regular HR, no murmur appreciated.   ABDOMEN:  soft, NT/ND, BS audible.   M/S:   no joint deformity noted on observation.   SKIN:  No rash noted on observation  NEURO:   No NFD appreciated on observation.   PSYCH:  affect and mood normal      Lab/Diagnostic Data: Reviewed in the chart and EHR.        ASSESSMENT/PLAN:  --------------------------  Covid19 viral infection  Atypical chest pain on the right side  (G47.33) RAJANI treated with BiPAP  - symptomatic will start Molnupiravir , pt in agreement  - chest pain 3/10 over night, on the right side of the chest at the lower part, localized, no aggravating or receiving factors, none today. Will monitor closely. Query pleurisy vs interstitial muscle sprain from cough.       (Z47.89) Aftercare following surgery of the musculoskeletal system  (primary encounter diagnosis)  (S72.857D) Closed 2-part intertrochanteric fracture of left femur with routine healing, subsequent encounter  (R53.81) Physical deconditioning  - did not tolerated opioid due to changes in the mentation and OH.   - Analgesia optimal with the current regimen. Continue present plan and medications.  - Followed by Orthopedic Team. Follow on the recommendations / instructions.   - DVT prophylaxis according to Orthopedic team recommendations  - Started rehab program, making a progress, continue until desired goal is achieved.       (I95.1) Orthostatic hypotension  (G90.3) Neurogenic orthostatic hypotension (H)  - several meds discontinued while IP.   - on midodrine. Be aware of supine HTN. Continue meds and continue to monitor and adjust med accordingly.     (D50.0) Blood loss anemia  - required 2 U PRBCs transfusion while IP. Started on iron supplement.   - Trend Hb/Hct   - clinicaly stable    (E11.40) Type 2 diabetes mellitus with diabetic neuropathy, without long-term current use of insulin  (H)  -HbA1C 5.5%, over controlled. Glipzide discontinued while IP  - goal is symptoms management of hyperglycemia.      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   - no behaviors     Orders:  -Molnupiravir    Total time spent with patient visit at the skilled nursing facility was 48 minutes including patient visit, review extensive past records, facility and epic notes since admission , discussing with the nursing and rehab team, formulating A/P and discussing it with the patient, and the medical team at the facility. All questions answered in detailed.       Electronically signed by: Jaclyn Cruz MD

## 2023-10-04 ENCOUNTER — TRANSITIONAL CARE UNIT VISIT (OUTPATIENT)
Dept: GERIATRICS | Facility: CLINIC | Age: 88
End: 2023-10-04
Payer: MEDICARE

## 2023-10-04 VITALS
WEIGHT: 182.6 LBS | HEART RATE: 72 BPM | TEMPERATURE: 97.8 F | OXYGEN SATURATION: 96 % | BODY MASS INDEX: 31.18 KG/M2 | SYSTOLIC BLOOD PRESSURE: 130 MMHG | RESPIRATION RATE: 18 BRPM | HEIGHT: 64 IN | DIASTOLIC BLOOD PRESSURE: 70 MMHG

## 2023-10-04 DIAGNOSIS — G90.3 NEUROGENIC ORTHOSTATIC HYPOTENSION (H): ICD-10-CM

## 2023-10-04 DIAGNOSIS — D50.0 BLOOD LOSS ANEMIA: ICD-10-CM

## 2023-10-04 DIAGNOSIS — G30.1 MILD LATE ONSET ALZHEIMER'S DEMENTIA WITHOUT BEHAVIORAL DISTURBANCE, PSYCHOTIC DISTURBANCE, MOOD DISTURBANCE, OR ANXIETY (H): ICD-10-CM

## 2023-10-04 DIAGNOSIS — I95.1 ORTHOSTATIC HYPOTENSION: ICD-10-CM

## 2023-10-04 DIAGNOSIS — F02.A0 MILD LATE ONSET ALZHEIMER'S DEMENTIA WITHOUT BEHAVIORAL DISTURBANCE, PSYCHOTIC DISTURBANCE, MOOD DISTURBANCE, OR ANXIETY (H): ICD-10-CM

## 2023-10-04 DIAGNOSIS — R53.81 PHYSICAL DECONDITIONING: ICD-10-CM

## 2023-10-04 DIAGNOSIS — G47.33 OSA TREATED WITH BIPAP: ICD-10-CM

## 2023-10-04 DIAGNOSIS — E11.40 TYPE 2 DIABETES MELLITUS WITH DIABETIC NEUROPATHY, WITHOUT LONG-TERM CURRENT USE OF INSULIN (H): ICD-10-CM

## 2023-10-04 DIAGNOSIS — R07.89 ATYPICAL CHEST PAIN: ICD-10-CM

## 2023-10-04 DIAGNOSIS — U07.1 INFECTION DUE TO 2019 NOVEL CORONAVIRUS: Primary | ICD-10-CM

## 2023-10-04 DIAGNOSIS — Z47.89 AFTERCARE FOLLOWING SURGERY OF THE MUSCULOSKELETAL SYSTEM: ICD-10-CM

## 2023-10-04 DIAGNOSIS — S72.142D CLOSED 2-PART INTERTROCHANTERIC FRACTURE OF LEFT FEMUR WITH ROUTINE HEALING, SUBSEQUENT ENCOUNTER: ICD-10-CM

## 2023-10-04 PROCEDURE — 99306 1ST NF CARE HIGH MDM 50: CPT | Performed by: FAMILY MEDICINE

## 2023-10-04 NOTE — LETTER
"    10/4/2023        RE: Malina Miller  [address] [address] [address] [facility] [address]        [facility] GERIATRICS  Primary Care Provider & Clinic: Cici Nina, [address] [facility] [address]  Chief Complaint   Patient presents with     Hospital F/U     [facility] 9/9/2023 - 9/18/2023     [place] Medical Record Number: [id number]  Place of Service Where Encounter Took Place: [facility] ([facility]) [[id number]]    Malina lopez is a 89 year old (8/10/1932), admitted to the above facility from  [facility] . Hospital stay 9/9/23 through 9/18/23.    HPI:    As per GNP's note:\"Brief Hospital Course: PMH of hypothyroidism, neuropathy, DM2, RAJANI, CAD, HTN,  who presented after a fall. Found to have left intertrochanteric femur fracture. S/p nailing on 9/10/23. Had significant orthostatic hypotension, all Lasix, losartan, amlodipine stopped and she was started on midodrine. Narcotics stopped. Started on salt tabs for hyponatremia. Had episode of syncope after a BM, no further episodes. Had post-op anemia, was transfused 2 units PRBC and was started on Foltx. Glipizide discontinued. Was noted to be significantly deconditioned and TCU was recommended. When medically stable was discharged to TCU for further rehab and medical management.  \"      TODAY:  -COVID19: On 10/3. Reports started having cough two days ago, could not bring up the phlegm. Denies SOB, no fever or chills, no sore throat, n/v/diarrhea, no muscles or joints [ain. RN reports develped fever today.   - Lt IT fx : doing well,  aggravated with exercise, otherwise no concern. Better with meds.   - ABLA: appetite is fine.   - Rehab: going really good.   - OH: denies feeling dizzy when stands up    =================================================================    CODE STATUS/ADVANCE DIRECTIVES DISCUSSION: No Order -   Patient's living condition: lives with spouse  ALLERGIES:   Allergies   Allergen Reactions     Metformin Diarrhea "     Spironolactone Headache     Hyponatremia      PAST MEDICAL HISTORY: No past medical history on file.   PAST SURGICAL HISTORY:  has no past surgical history on file.  FAMILY HISTORY: family history is not on file.  SOCIAL HISTORY:      Post Discharge Medication Reconciliation Status:  MED REC REQUIRED  Post Medication Reconciliation Status: discharge medications reconciled and changed, per note/orders    Current Outpatient Medications   Medication Sig     acetaminophen (TYLENOL) 500 MG tablet Take 1,000 mg by mouth 3 times daily     aspirin 81 MG EC tablet Take 81 mg by mouth 2 times daily (with meals)     atorvastatin (LIPITOR) 10 MG tablet Take 10 mg by mouth daily     carboxymethylcellulose PF (REFRESH PLUS) 0.5 % ophthalmic solution Place 1 drop into both eyes 4 times daily as needed for dry eyes     Folic Acid-Vit B6-Vit B12 2.3-24.5-2 MG TABS Take 1 tablet by mouth daily     levothyroxine (SYNTHROID/LEVOTHROID) 112 MCG tablet Take 112 mcg by mouth daily     Lidocaine (LIDOCARE) 4 % Patch Place 1 patch onto the skin every 24 hours To prevent lidocaine toxicity, patient should be patch free for 12 hrs daily.     melatonin 3 MG tablet Take 3 mg by mouth At Bedtime     midodrine (PROAMATINE) 10 MG tablet Take 20 mg by mouth 2 times daily     pantoprazole (PROTONIX) 40 MG EC tablet Take 40 mg by mouth daily     psyllium (METAMUCIL/KONSYL) 58.6 % powder Take 6 g (1 teaspoonful) by mouth daily     sodium chloride 1 GM tablet Take 1 g by mouth 2 times daily     traMADol (ULTRAM) 50 MG tablet Take 0.5 tablets (25 mg) by mouth every 6 hours as needed for severe pain Ok to pull 1 tab from E-kit     No current facility-administered medications for this visit.     ROS:  10 point ROS of systems including Constitutional, Eyes, Respiratory, Cardiovascular, Gastroenterology, Genitourinary, Integumentary, Musculoskeletal, Psychiatric were all negative except for pertinent positives noted in my HPI.    Vitals:  /70    "Pulse 72   Temp 97.8  F (36.6  C)   Resp 18   Ht 1.626 m (5' 4\")   Wt 82.8 kg (182 lb 9.6 oz)   SpO2 96%   BMI 31.34 kg/m    Exam:  GENERAL APPEARANCE:  in no distress,   RESP:  congested lung during cough  CV:  S1S2 audible, regular HR, no murmur appreciated.   ABDOMEN:  soft, NT/ND, BS audible.   M/S:   no joint deformity noted on observation.   SKIN:  No rash noted on observation  NEURO:   No NFD appreciated on observation.   PSYCH:  affect and mood normal      Lab/Diagnostic Data: Reviewed in the chart and EHR.        ASSESSMENT/PLAN:  --------------------------  Covid19 viral infection  Atypical chest pain on the right side  (G47.33) RAJANI treated with BiPAP  - symptomatic will start Molnupiravir , pt in agreement  - chest pain 3/10 over night, on the right side of the chest at the lower part, localized, no aggravating or receiving factors, none today. Will monitor closely. Query pleurisy vs interstitial muscle sprain from cough.       (Z47.89) Aftercare following surgery of the musculoskeletal system  (primary encounter diagnosis)  (S72.152D) Closed 2-part intertrochanteric fracture of left femur with routine healing, subsequent encounter  (R53.81) Physical deconditioning  - did not tolerated opioid due to changes in the mentation and OH.   - Analgesia optimal with the current regimen. Continue present plan and medications.  - Followed by Orthopedic Team. Follow on the recommendations / instructions.   - DVT prophylaxis according to Orthopedic team recommendations  - Started rehab program, making a progress, continue until desired goal is achieved.       (I95.1) Orthostatic hypotension  (G90.3) Neurogenic orthostatic hypotension (H)  - several meds discontinued while IP.   - on midodrine. Be aware of supine HTN. Continue meds and continue to monitor and adjust med accordingly.     (D50.0) Blood loss anemia  - required 2 U PRBCs transfusion while IP. Started on iron supplement.   - Trend Hb/Hct   - " clinicaly stable    (E11.40) Type 2 diabetes mellitus with diabetic neuropathy, without long-term current use of insulin (H)  -HbA1C 5.5%, over controlled. Glipzide discontinued while IP  - goal is symptoms management of hyperglycemia.      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   - no behaviors     Orders:  -Molnupiravir    Total time spent with patient visit at the skilled nursing facility was 48 minutes including patient visit, review extensive past records, facility and epic notes since admission , discussing with the nursing and rehab team, formulating A/P and discussing it with the patient, and the medical team at the facility. All questions answered in detailed.       Electronically signed by: Jaclyn Cruz MD      Sincerely,        Jaclyn Cruz MD

## 2023-10-10 PROBLEM — U07.1 COVID-19 VIRUS INFECTION: Status: ACTIVE | Noted: 2023-10-01

## 2023-10-13 ENCOUNTER — TRANSITIONAL CARE UNIT VISIT (OUTPATIENT)
Dept: GERIATRICS | Facility: CLINIC | Age: 88
End: 2023-10-13
Payer: MEDICARE

## 2023-10-13 VITALS
HEIGHT: 64 IN | OXYGEN SATURATION: 96 % | SYSTOLIC BLOOD PRESSURE: 189 MMHG | WEIGHT: 182.6 LBS | RESPIRATION RATE: 18 BRPM | HEART RATE: 76 BPM | TEMPERATURE: 98.5 F | BODY MASS INDEX: 31.18 KG/M2 | DIASTOLIC BLOOD PRESSURE: 85 MMHG

## 2023-10-13 DIAGNOSIS — N30.00 ACUTE CYSTITIS WITHOUT HEMATURIA: ICD-10-CM

## 2023-10-13 DIAGNOSIS — D62 ABLA (ACUTE BLOOD LOSS ANEMIA): ICD-10-CM

## 2023-10-13 DIAGNOSIS — S72.142D CLOSED 2-PART INTERTROCHANTERIC FRACTURE OF LEFT FEMUR WITH ROUTINE HEALING, SUBSEQUENT ENCOUNTER: ICD-10-CM

## 2023-10-13 DIAGNOSIS — K59.01 SLOW TRANSIT CONSTIPATION: ICD-10-CM

## 2023-10-13 DIAGNOSIS — U07.1 INFECTION DUE TO 2019 NOVEL CORONAVIRUS: Primary | ICD-10-CM

## 2023-10-13 DIAGNOSIS — I95.1 ORTHOSTATIC HYPOTENSION: ICD-10-CM

## 2023-10-13 DIAGNOSIS — I10 PRIMARY HYPERTENSION: ICD-10-CM

## 2023-10-13 DIAGNOSIS — Z47.89 AFTERCARE FOLLOWING SURGERY OF THE MUSCULOSKELETAL SYSTEM: ICD-10-CM

## 2023-10-13 DIAGNOSIS — R53.81 PHYSICAL DECONDITIONING: ICD-10-CM

## 2023-10-13 PROCEDURE — 99309 SBSQ NF CARE MODERATE MDM 30: CPT | Performed by: NURSE PRACTITIONER

## 2023-10-13 RX ORDER — AMLODIPINE BESYLATE 2.5 MG/1
2.5 TABLET ORAL AT BEDTIME
Start: 2023-10-13 | End: 2023-10-17

## 2023-10-13 RX ORDER — SENNOSIDES 8.6 MG
1 TABLET ORAL 2 TIMES DAILY
Start: 2023-10-13 | End: 2023-10-24

## 2023-10-13 NOTE — PROGRESS NOTES
[facility] GERIATRICS  ACUTE/EPISODIC VISIT    [facility] Medical Record Number:  [id number]  Place of Service where encounter took place:  [facility] ([facility]) [[id number]]    Chief Complaint   Patient presents with    RECHECK     ED FU       HPI:    Malina Miller is a 89 year old  (8/10/1932), who is being seen today for an episodic care visit.  HPI information obtained from: facility chart records, facility staff, patient report, and [facility] chart review.    Today's concern is: Recently hospitalized after fall due to orthostasis with left hip fracture, s/p ORIF. Was seen in the ED on 10/10 with increased confusion. Head CT negative for acute findings. UA concerning for UTI, so was started on Kelfex. Also had noted hypertension so was started on low dose amlodipine and discharged back to U. She has continued to have elevated BP over the past few days running 155-210s, which one reading of 240. She was given an additional dose of amlodipine when BP was >200 and it improved to 170s. She denies any chest pain or SOB. Had had some intermittent confusions, appears to be worse after she takes tramadol but she has not used this much. She reports no further dizziness or lightheadedness she stands up. Has not had a BM in over 2 days. Appetite is ok.       ALLERGIES:    Allergies   Allergen Reactions    Metformin Diarrhea    Spironolactone Headache     Hyponatremia        MEDICATIONS:  Post Discharge Medication Reconciliation Status: medication reconcilation previously completed during another office visit.     Current Outpatient Medications   Medication Sig Dispense Refill    amLODIPine (NORVASC) 2.5 MG tablet Take 1 tablet (2.5 mg) by mouth at bedtime      cephALEXin (KEFLEX) 250 MG capsule Take 1 capsule (250 mg) by mouth 4 times daily for 5 days      sennosides (SENOKOT) 8.6 MG tablet Take 1 tablet by mouth 2 times daily      acetaminophen (TYLENOL) 500 MG tablet Take 1,000 mg by mouth 3  "times daily      aspirin 81 MG EC tablet Take 81 mg by mouth 2 times daily (with meals)      atorvastatin (LIPITOR) 10 MG tablet Take 10 mg by mouth daily      carboxymethylcellulose PF (REFRESH PLUS) 0.5 % ophthalmic solution Place 1 drop into both eyes 4 times daily as needed for dry eyes      Folic Acid-Vit B6-Vit B12 2.3-24.5-2 MG TABS Take 1 tablet by mouth daily      levothyroxine (SYNTHROID/LEVOTHROID) 112 MCG tablet Take 112 mcg by mouth daily      Lidocaine (LIDOCARE) 4 % Patch Place 1 patch onto the skin every 24 hours To prevent lidocaine toxicity, patient should be patch free for 12 hrs daily.      melatonin 3 MG tablet Take 3 mg by mouth At Bedtime      midodrine (PROAMATINE) 10 MG tablet Take 10 mg by mouth 2 times daily      pantoprazole (PROTONIX) 40 MG EC tablet Take 40 mg by mouth daily      psyllium (METAMUCIL/KONSYL) 58.6 % powder Take 6 g (1 teaspoonful) by mouth daily      sodium chloride 1 GM tablet Take 1 g by mouth 2 times daily      traMADol (ULTRAM) 50 MG tablet Take 0.5 tablets (25 mg) by mouth every 6 hours as needed for severe pain Ok to pull 1 tab from E-kit 12 tablet 0     Medications reviewed:  Medications reconciled to facility chart and changes were made to reflect current medications as identified as above med list. Below are the changes that were made:   Medications stopped since last EPIC medication reconciliation:   There are no discontinued medications.    Medications started since last [facility] medication reconciliation:  No orders of the defined types were placed in this encounter.        REVIEW OF SYSTEMS:  4 point ROS neg other than the symptoms noted above in the HPI.      PHYSICAL EXAM:  BP (!) 189/85   Pulse 76   Temp 98.5  F (36.9  C)   Resp 18   Ht 1.626 m (5' 4\")   Wt 82.8 kg (182 lb 9.6 oz)   SpO2 96%   BMI 31.34 kg/m    Physical Exam  Cardiovascular:      Rate and Rhythm: Normal rate and regular rhythm.      Heart sounds: Normal heart sounds.   Pulmonary:      " Effort: Pulmonary effort is normal.      Breath sounds: Normal breath sounds.   Abdominal:      General: Bowel sounds are normal.      Palpations: Abdomen is soft.   Musculoskeletal:      Right lower leg: Edema present.      Comments: Decreased ROM to left hip   Neurological:      Mental Status: She is alert and oriented to person, place, and time.   Psychiatric:         Mood and Affect: Mood normal.      Comments: Forgetful at times           ASSESSMENT / PLAN:  Infection due to 2019 novel coronavirus  + on 10/3, today is day 10 of quarantine. Has completed course of molnupiravir. Denies symptoms currently  - ok to discontinue COVID precaution on 10/14    Acute cystitis without hematuria  UA concerning for UTI in ED, does not appear UC was done.   - cephALEXin (KEFLEX) 250 MG capsule; Take 1 capsule (250 mg) by mouth 4 times daily for 5 days    Aftercare following surgery of the musculoskeletal system  Closed 2-part intertrochanteric fracture of left femur with routine healing, subsequent encounter  Physical deconditioning  Secondary to fall. Surgery without complication. Did not tolerate narcotics as they caused confusion and orthostasis. Tried low dose tramadol in TCU due to severe pain, again caused lightheadedness (improved), some confusion, but she feel tolerable and only using tramadol rarely. Has been getting E-stim which she feels is effective. Had follow-up with ortho on 9/28, staples removed. Mobility remains limited.   - tramadol 25mg q6h PRN,  APAP 1000mg TID, lidocaine patches  - PT/OT, E-stim   - DVT ppx with ASA 81mg BID x 6 weeks   - follow-up with ortho    Orthostatic hypotension  Primary hypertension  No further s/s of orthostasis, but has been have very elevated SBP. Spoke with pharmacist, ? Midodrine contributing to HTN as she is on very high dose. Also recommended changing amlodipine to HS to reduce orthostasis  - amLODIPine (NORVASC) 2.5 MG tablet; Take 1 tablet (2.5 mg) by mouth at bedtime  -  decrease midodrine to 10mg BID, hold if SBP >/= 150  - monitor closely and adjust     ABLA (acute blood loss anemia)  Hgb 12.5 on recent labs  - check PRN    Slow transit constipation  Reports no BM in 2 days  - sennosides (SENOKOT) 8.6 MG tablet; Take 1 tablet by mouth 2 times daily  - continue metamucil    Orders:  Decrease midodrine to 10mg BID, hold for SBP >145  Change amlodipine to HS administration  Senna 1 tab PO BID     Electronically signed by  MARISOL Dean CNP

## 2023-10-13 NOTE — LETTER
10/13/2023        RE: Malina Miller  [address] [address]        [facility] GERIATRICS  ACUTE/EPISODIC VISIT    [facility] Medical Record Number:  [id number]  Place of Service where encounter took place:  [facility] ([facility]) [[id number]]    Chief Complaint   Patient presents with     RECHECK     ED FU       HPI:    Malina Miller is a 89 year old  (8/10/1932), who is being seen today for an episodic care visit.  HPI information obtained from: facility chart records, facility staff, patient report, and [facility] chart review.    Today's concern is: Recently hospitalized after fall due to orthostasis with left hip fracture, s/p ORIF. Was seen in the ED on 10/10 with increased confusion. Head CT negative for acute findings. UA concerning for UTI, so was started on Kelfex. Also had noted hypertension so was started on low dose amlodipine and discharged back to U. She has continued to have elevated BP over the past few days running 155-210s, which one reading of 240. She was given an additional dose of amlodipine when BP was >200 and it improved to 170s. She denies any chest pain or SOB. Had had some intermittent confusions, appears to be worse after she takes tramadol but she has not used this much. She reports no further dizziness or lightheadedness she stands up. Has not had a BM in over 2 days. Appetite is ok.       ALLERGIES:    Allergies   Allergen Reactions     Metformin Diarrhea     Spironolactone Headache     Hyponatremia        MEDICATIONS:  Post Discharge Medication Reconciliation Status: medication reconcilation previously completed during another office visit.     Current Outpatient Medications   Medication Sig Dispense Refill     amLODIPine (NORVASC) 2.5 MG tablet Take 1 tablet (2.5 mg) by mouth at bedtime       cephALEXin (KEFLEX) 250 MG capsule Take 1 capsule (250 mg) by mouth 4 times daily for 5 days       sennosides (SENOKOT) 8.6 MG  "tablet Take 1 tablet by mouth 2 times daily       acetaminophen (TYLENOL) 500 MG tablet Take 1,000 mg by mouth 3 times daily       aspirin 81 MG EC tablet Take 81 mg by mouth 2 times daily (with meals)       atorvastatin (LIPITOR) 10 MG tablet Take 10 mg by mouth daily       carboxymethylcellulose PF (REFRESH PLUS) 0.5 % ophthalmic solution Place 1 drop into both eyes 4 times daily as needed for dry eyes       Folic Acid-Vit B6-Vit B12 2.3-24.5-2 MG TABS Take 1 tablet by mouth daily       levothyroxine (SYNTHROID/LEVOTHROID) 112 MCG tablet Take 112 mcg by mouth daily       Lidocaine (LIDOCARE) 4 % Patch Place 1 patch onto the skin every 24 hours To prevent lidocaine toxicity, patient should be patch free for 12 hrs daily.       melatonin 3 MG tablet Take 3 mg by mouth At Bedtime       midodrine (PROAMATINE) 10 MG tablet Take 10 mg by mouth 2 times daily       pantoprazole (PROTONIX) 40 MG EC tablet Take 40 mg by mouth daily       psyllium (METAMUCIL/KONSYL) 58.6 % powder Take 6 g (1 teaspoonful) by mouth daily       sodium chloride 1 GM tablet Take 1 g by mouth 2 times daily       traMADol (ULTRAM) 50 MG tablet Take 0.5 tablets (25 mg) by mouth every 6 hours as needed for severe pain Ok to pull 1 tab from E-kit 12 tablet 0     Medications reviewed:  Medications reconciled to facility chart and changes were made to reflect current medications as identified as above med list. Below are the changes that were made:   Medications stopped since last EPIC medication reconciliation:   There are no discontinued medications.    Medications started since last [facility] medication reconciliation:  No orders of the defined types were placed in this encounter.        REVIEW OF SYSTEMS:  4 point ROS neg other than the symptoms noted above in the HPI.      PHYSICAL EXAM:  BP (!) 189/85   Pulse 76   Temp 98.5  F (36.9  C)   Resp 18   Ht 1.626 m (5' 4\")   Wt 82.8 kg (182 lb 9.6 oz)   SpO2 96%   BMI 31.34 kg/m    Physical " Exam  Cardiovascular:      Rate and Rhythm: Normal rate and regular rhythm.      Heart sounds: Normal heart sounds.   Pulmonary:      Effort: Pulmonary effort is normal.      Breath sounds: Normal breath sounds.   Abdominal:      General: Bowel sounds are normal.      Palpations: Abdomen is soft.   Musculoskeletal:      Right lower leg: Edema present.      Comments: Decreased ROM to left hip   Neurological:      Mental Status: She is alert and oriented to person, place, and time.   Psychiatric:         Mood and Affect: Mood normal.      Comments: Forgetful at times           ASSESSMENT / PLAN:  Infection due to 2019 novel coronavirus  + on 10/3, today is day 10 of quarantine. Has completed course of molnupiravir. Denies symptoms currently  - ok to discontinue COVID precaution on 10/14    Acute cystitis without hematuria  UA concerning for UTI in ED, does not appear UC was done.   - cephALEXin (KEFLEX) 250 MG capsule; Take 1 capsule (250 mg) by mouth 4 times daily for 5 days    Aftercare following surgery of the musculoskeletal system  Closed 2-part intertrochanteric fracture of left femur with routine healing, subsequent encounter  Physical deconditioning  Secondary to fall. Surgery without complication. Did not tolerate narcotics as they caused confusion and orthostasis. Tried low dose tramadol in TCU due to severe pain, again caused lightheadedness (improved), some confusion, but she feel tolerable and only using tramadol rarely. Has been getting E-stim which she feels is effective. Had follow-up with ortho on 9/28, staples removed. Mobility remains limited.   - tramadol 25mg q6h PRN,  APAP 1000mg TID, lidocaine patches  - PT/OT, E-stim   - DVT ppx with ASA 81mg BID x 6 weeks   - follow-up with ortho    Orthostatic hypotension  Primary hypertension  No further s/s of orthostasis, but has been have very elevated SBP. Spoke with pharmacist, ? Midodrine contributing to HTN as she is on very high dose. Also  recommended changing amlodipine to HS to reduce orthostasis  - amLODIPine (NORVASC) 2.5 MG tablet; Take 1 tablet (2.5 mg) by mouth at bedtime  - decrease midodrine to 10mg BID, hold if SBP >/= 150  - monitor closely and adjust     ABLA (acute blood loss anemia)  Hgb 12.5 on recent labs  - check PRN    Slow transit constipation  Reports no BM in 2 days  - sennosides (SENOKOT) 8.6 MG tablet; Take 1 tablet by mouth 2 times daily  - continue metamucil    Orders:  Decrease midodrine to 10mg BID, hold for SBP >145  Change amlodipine to HS administration  Senna 1 tab PO BID     Electronically signed by  MARISOL Dean CNP            Sincerely,        MARISOL Dean CNP

## 2023-10-16 ENCOUNTER — TELEPHONE (OUTPATIENT)
Dept: GERIATRICS | Facility: CLINIC | Age: 88
End: 2023-10-16
Payer: MEDICARE

## 2023-10-16 NOTE — TELEPHONE ENCOUNTER
[facility]    Provider: MARISOL Small CNP  Facility: [facility] Facility Type:  TCU    Caller: Nupur   Call Back Number: [phone]     Allergies:    Allergies   Allergen Reactions    Metformin Diarrhea    Spironolactone Headache     Hyponatremia        Reason for call: Nursing is calling to report that the patient's blood pressure is 199/82 pulse 72.  Patient does have an order for midodrine 10 mg twice a day to hold if blood pressure greater than 150.  Midodrine was given this morning.   Patient does have an order for amlodipine 2.5 mg at at bedtime.  Patient did have a blood pressure over the weekend that systolic was 240, on-call was called and clonidine 0.1 mg was given.     Verbal Order/Direction given by Provider:   clonidine 0.1mg x1 now. Have them hold AM midodrine, NP to see her tomorrow on rounds. Ok to give amlodipine now.     Provider giving Order:  MARISOL Small CNP    Verbal Order given to: Nupur Wilkerson RN

## 2023-10-16 NOTE — PROGRESS NOTES
[facility] GERIATRICS  ACUTE/EPISODIC VISIT    [facility] Medical Record Number: [id number]  Place of Service where encounter took place: [facility] ([facility]) [[id number]]    Chief Complaint   Patient presents with    RECHECK     HPI:    Malina Miller is a 89 year old (8/10/1932), who is being seen today for an episodic care visit. HPI information obtained from: facility chart records, facility staff, patient report, [facility] chart review, and Care Everywhere [facility] chart review.    Today's concern is:  Recently hospitalized after fall due to orthostasis with left hip fracture, s/p ORIF. Was seen in the ED on 10/10 with increased confusion. Head CT negative for acute findings. UA concerning for UTI, so was started on Kelfex. Continues to have elevated BP, was given PRN clonidine x1 yesterday evening for BP of 199/82. Unfortunately BP was not rechecked until this AM after PT, and it was still elevated in the 170s. She denies any headaches, chest pain or SOB. No dizziness or lightheadness with ambulation. Has been able to walk up to 80 ft with therapy with 2WW.     ALLERGIES:   Allergies   Allergen Reactions    Metformin Diarrhea    Spironolactone Headache     Hyponatremia      MEDICATIONS:  Post Discharge Medication Reconciliation Status: medication reconcilation previously completed during another office visit.     Current Outpatient Medications   Medication Sig Dispense Refill    acetaminophen (TYLENOL) 500 MG tablet Take 1,000 mg by mouth 3 times daily      amLODIPine (NORVASC) 2.5 MG tablet Take 1 tablet (2.5 mg) by mouth at bedtime      aspirin 81 MG EC tablet Take 81 mg by mouth 2 times daily (with meals)      atorvastatin (LIPITOR) 10 MG tablet Take 10 mg by mouth daily      carboxymethylcellulose PF (REFRESH PLUS) 0.5 % ophthalmic solution Place 1 drop into both eyes 4 times daily as needed for dry eyes      Folic Acid-Vit B6-Vit B12 2.3-24.5-2 MG TABS Take 1 tablet by mouth daily    "   levothyroxine (SYNTHROID/LEVOTHROID) 112 MCG tablet Take 112 mcg by mouth daily      Lidocaine (LIDOCARE) 4 % Patch Place 1 patch onto the skin every 24 hours To prevent lidocaine toxicity, patient should be patch free for 12 hrs daily.      melatonin 3 MG tablet Take 3 mg by mouth At Bedtime      midodrine (PROAMATINE) 10 MG tablet Take 10 mg by mouth 2 times daily      pantoprazole (PROTONIX) 40 MG EC tablet Take 40 mg by mouth daily      psyllium (METAMUCIL/KONSYL) 58.6 % powder Take 6 g (1 teaspoonful) by mouth daily      sennosides (SENOKOT) 8.6 MG tablet Take 1 tablet by mouth 2 times daily      sodium chloride 1 GM tablet Take 1 g by mouth 2 times daily      traMADol (ULTRAM) 50 MG tablet Take 0.5 tablets (25 mg) by mouth every 6 hours as needed for severe pain Ok to pull 1 tab from E-kit 12 tablet 0     Medications reviewed:  Medications reconciled to facility chart and changes were made to reflect current medications as identified as above med list. Below are the changes that were made:   Medications stopped since last EPIC medication reconciliation:   There are no discontinued medications.    Medications started since last [facility] medication reconciliation:  No orders of the defined types were placed in this encounter.        REVIEW OF SYSTEMS:  4 point ROS neg other than the symptoms noted above in the HPI.      PHYSICAL EXAM:  BP (!) 199/82   Pulse 72   Temp 98.1  F (36.7  C)   Resp 18   Ht 1.626 m (5' 4\")   Wt 83.5 kg (184 lb)   SpO2 98%   BMI 31.58 kg/m    Physical Exam  Cardiovascular:      Rate and Rhythm: Normal rate and regular rhythm.      Heart sounds: Normal heart sounds.   Pulmonary:      Effort: Pulmonary effort is normal.      Breath sounds: Normal breath sounds.   Abdominal:      General: Bowel sounds are normal.      Palpations: Abdomen is soft.   Musculoskeletal:      Right lower leg: Edema present.      Left lower leg: Edema present.   Neurological:      General: No focal deficit " present.      Mental Status: She is alert.   Psychiatric:         Mood and Affect: Mood normal.         Thought Content: Thought content normal.         ASSESSMENT / PLAN:  Aftercare following surgery of the musculoskeletal system  Closed 2-part intertrochanteric fracture of left femur with routine healing, subsequent encounter  Physical deconditioning  Secondary to fall. Surgery without complication. Did not tolerate narcotics as they caused confusion and orthostasis. Tried low dose tramadol in TCU due to severe pain, again caused lightheadedness (improved), some confusion, but she feel tolerable and only using tramadol rarely. Has been getting E-stim which she feels is effective. Had follow-up with ortho on 9/28, staples removed. Mobility remains limited but is improving.   - tramadol 25mg q6h PRN,  APAP 1000mg TID, lidocaine patches  - PT/OT, E-stim   - DVT ppx with ASA 81mg BID x 6 weeks   - follow-up with ortho    Primary hypertension  Orthostatic hypotension  No further orthostasis noted, BP improved, (no longer >200), but remains elevated at times 140-190s.   - decreased midodrine to 5mg BID, hold for SBP >140  - increase amlodipine to 5mg at bedtime  - monitor and adjsut     Slow transit constipation  + BM   - sennosides (SENOKOT) 8.6 MG tablet; Take 1 tablet by mouth 2 times daily  - continue metamucil    Acute cystitis without hematuria  Resolved, no further symptoms. Completed Keflex BID x 5 days.       Orders:  Decrease midodrine to 5mg BID, hold for SBP <140  Increase amlodipine to 5mg at bedtime     Electronically signed by:  MARISOL Dean CNP

## 2023-10-17 ENCOUNTER — TRANSITIONAL CARE UNIT VISIT (OUTPATIENT)
Dept: GERIATRICS | Facility: CLINIC | Age: 88
End: 2023-10-17
Payer: MEDICARE

## 2023-10-17 VITALS
TEMPERATURE: 98.1 F | HEART RATE: 72 BPM | HEIGHT: 64 IN | SYSTOLIC BLOOD PRESSURE: 199 MMHG | DIASTOLIC BLOOD PRESSURE: 82 MMHG | WEIGHT: 184 LBS | BODY MASS INDEX: 31.41 KG/M2 | RESPIRATION RATE: 18 BRPM | OXYGEN SATURATION: 98 %

## 2023-10-17 DIAGNOSIS — Z47.89 AFTERCARE FOLLOWING SURGERY OF THE MUSCULOSKELETAL SYSTEM: Primary | ICD-10-CM

## 2023-10-17 DIAGNOSIS — N30.00 ACUTE CYSTITIS WITHOUT HEMATURIA: ICD-10-CM

## 2023-10-17 DIAGNOSIS — I95.1 ORTHOSTATIC HYPOTENSION: ICD-10-CM

## 2023-10-17 DIAGNOSIS — K59.01 SLOW TRANSIT CONSTIPATION: ICD-10-CM

## 2023-10-17 DIAGNOSIS — R53.81 PHYSICAL DECONDITIONING: ICD-10-CM

## 2023-10-17 DIAGNOSIS — I10 PRIMARY HYPERTENSION: ICD-10-CM

## 2023-10-17 DIAGNOSIS — S72.142D CLOSED 2-PART INTERTROCHANTERIC FRACTURE OF LEFT FEMUR WITH ROUTINE HEALING, SUBSEQUENT ENCOUNTER: ICD-10-CM

## 2023-10-17 PROCEDURE — 99309 SBSQ NF CARE MODERATE MDM 30: CPT | Performed by: NURSE PRACTITIONER

## 2023-10-17 RX ORDER — AMLODIPINE BESYLATE 2.5 MG/1
5 TABLET ORAL AT BEDTIME
Start: 2023-10-17 | End: 2023-10-20

## 2023-10-17 NOTE — LETTER
10/17/2023        RE: Malina Miller  [address] [address] [address]        [facility] GERIATRICS  ACUTE/EPISODIC VISIT    [facility] Medical Record Number: [id number]  Place of Service where encounter took place: [facility] ([facility]) [[id number]]    Chief Complaint   Patient presents with     RECHECK     HPI:    Malina Miller is a 89 year old (8/10/1932), who is being seen today for an episodic care visit. HPI information obtained from: facility chart records, facility staff, patient report, [facility] chart review, and Care Everywhere [facility] chart review.    Today's concern is:  Recently hospitalized after fall due to orthostasis with left hip fracture, s/p ORIF. Was seen in the ED on 10/10 with increased confusion. Head CT negative for acute findings. UA concerning for UTI, so was started on Kelfex. Continues to have elevated BP, was given PRN clonidine x1 yesterday evening for BP of 199/82. Unfortunately BP was not rechecked until this AM after PT, and it was still elevated in the 170s. She denies any headaches, chest pain or SOB. No dizziness or lightheadness with ambulation. Has been able to walk up to 80 ft with therapy with 2WW.     ALLERGIES:   Allergies   Allergen Reactions     Metformin Diarrhea     Spironolactone Headache     Hyponatremia      MEDICATIONS:  Post Discharge Medication Reconciliation Status: medication reconcilation previously completed during another office visit.     Current Outpatient Medications   Medication Sig Dispense Refill     acetaminophen (TYLENOL) 500 MG tablet Take 1,000 mg by mouth 3 times daily       amLODIPine (NORVASC) 2.5 MG tablet Take 1 tablet (2.5 mg) by mouth at bedtime       aspirin 81 MG EC tablet Take 81 mg by mouth 2 times daily (with meals)       atorvastatin (LIPITOR) 10 MG tablet Take 10 mg by mouth daily       carboxymethylcellulose PF (REFRESH PLUS) 0.5 % ophthalmic solution Place 1 drop into both eyes 4 times  "daily as needed for dry eyes       Folic Acid-Vit B6-Vit B12 2.3-24.5-2 MG TABS Take 1 tablet by mouth daily       levothyroxine (SYNTHROID/LEVOTHROID) 112 MCG tablet Take 112 mcg by mouth daily       Lidocaine (LIDOCARE) 4 % Patch Place 1 patch onto the skin every 24 hours To prevent lidocaine toxicity, patient should be patch free for 12 hrs daily.       melatonin 3 MG tablet Take 3 mg by mouth At Bedtime       midodrine (PROAMATINE) 10 MG tablet Take 10 mg by mouth 2 times daily       pantoprazole (PROTONIX) 40 MG EC tablet Take 40 mg by mouth daily       psyllium (METAMUCIL/KONSYL) 58.6 % powder Take 6 g (1 teaspoonful) by mouth daily       sennosides (SENOKOT) 8.6 MG tablet Take 1 tablet by mouth 2 times daily       sodium chloride 1 GM tablet Take 1 g by mouth 2 times daily       traMADol (ULTRAM) 50 MG tablet Take 0.5 tablets (25 mg) by mouth every 6 hours as needed for severe pain Ok to pull 1 tab from E-kit 12 tablet 0     Medications reviewed:  Medications reconciled to facility chart and changes were made to reflect current medications as identified as above med list. Below are the changes that were made:   Medications stopped since last EPIC medication reconciliation:   There are no discontinued medications.    Medications started since last [facility] medication reconciliation:  No orders of the defined types were placed in this encounter.        REVIEW OF SYSTEMS:  4 point ROS neg other than the symptoms noted above in the HPI.      PHYSICAL EXAM:  BP (!) 199/82   Pulse 72   Temp 98.1  F (36.7  C)   Resp 18   Ht 1.626 m (5' 4\")   Wt 83.5 kg (184 lb)   SpO2 98%   BMI 31.58 kg/m    Physical Exam  Cardiovascular:      Rate and Rhythm: Normal rate and regular rhythm.      Heart sounds: Normal heart sounds.   Pulmonary:      Effort: Pulmonary effort is normal.      Breath sounds: Normal breath sounds.   Abdominal:      General: Bowel sounds are normal.      Palpations: Abdomen is soft.   Musculoskeletal: "      Right lower leg: Edema present.      Left lower leg: Edema present.   Neurological:      General: No focal deficit present.      Mental Status: She is alert.   Psychiatric:         Mood and Affect: Mood normal.         Thought Content: Thought content normal.         ASSESSMENT / PLAN:  Aftercare following surgery of the musculoskeletal system  Closed 2-part intertrochanteric fracture of left femur with routine healing, subsequent encounter  Physical deconditioning  Secondary to fall. Surgery without complication. Did not tolerate narcotics as they caused confusion and orthostasis. Tried low dose tramadol in TCU due to severe pain, again caused lightheadedness (improved), some confusion, but she feel tolerable and only using tramadol rarely. Has been getting E-stim which she feels is effective. Had follow-up with ortho on 9/28, staples removed. Mobility remains limited but is improving.   - tramadol 25mg q6h PRN,  APAP 1000mg TID, lidocaine patches  - PT/OT, E-stim   - DVT ppx with ASA 81mg BID x 6 weeks   - follow-up with ortho    Primary hypertension  Orthostatic hypotension  No further orthostasis noted, BP improved, (no longer >200), but remains elevated at times 140-190s.   - decreased midodrine to 5mg BID, hold for SBP >140  - increase amlodipine to 5mg at bedtime  - monitor and adjsut     Slow transit constipation  + BM   - sennosides (SENOKOT) 8.6 MG tablet; Take 1 tablet by mouth 2 times daily  - continue metamucil    Acute cystitis without hematuria  Resolved, no further symptoms. Completed Keflex BID x 5 days.       Orders:  Decrease midodrine to 5mg BID, hold for SBP <140  Increase amlodipine to 5mg at bedtime     Electronically signed by:  MARISOL Dean CNP      Sincerely,        MARISOL Dean CNP

## 2023-10-18 NOTE — PROGRESS NOTES
[facility] GERIATRICS  ACUTE/EPISODIC VISIT    [facility] Medical Record Number: [id number]  Place of Service where encounter took place: [facility] ([facility]) [[id number]]    Chief Complaint   Patient presents with    RECHECK     HPI:    Malina Miller is a 89 year old (8/10/1932), who is being seen today for an episodic care visit. HPI information obtained from: facility chart records, facility staff, patient report, [facility] chart review, and Care Everywhere [facility] chart review.    Today's concern is: Recently hospitalized after fall due to orthostasis with left hip fracture, s/p ORIF. Was seen in the ED on 10/10 with increased confusion. Head CT negative for acute findings. UA concerning for UTI, so was started on Kelfex. She continued to have elevated BP despite dose decreased in midodrine and increasing Amlodipine. She has not received midodrine since Monday. Denies any dizziness or lightheadedness. She has been able to walk up to 80ft with therapy. Reports she did not sleep well last night due to leg cramps, they are resolved this AM. She did have a small BM this AM.     ALLERGIES:   Allergies   Allergen Reactions    Metformin Diarrhea    Spironolactone Headache     Hyponatremia      MEDICATIONS:  Post Discharge Medication Reconciliation Status: medication reconcilation previously completed during another office visit.     Current Outpatient Medications   Medication Sig Dispense Refill    amLODIPine (NORVASC) 10 MG tablet Take 1 tablet (10 mg) by mouth at bedtime      acetaminophen (TYLENOL) 500 MG tablet Take 1,000 mg by mouth 3 times daily      aspirin 81 MG EC tablet Take 81 mg by mouth 2 times daily (with meals)      atorvastatin (LIPITOR) 10 MG tablet Take 10 mg by mouth daily      carboxymethylcellulose PF (REFRESH PLUS) 0.5 % ophthalmic solution Place 1 drop into both eyes 4 times daily as needed for dry eyes      Folic Acid-Vit B6-Vit B12 2.3-24.5-2 MG TABS Take 1 tablet by  "mouth daily      levothyroxine (SYNTHROID/LEVOTHROID) 112 MCG tablet Take 112 mcg by mouth daily      Lidocaine (LIDOCARE) 4 % Patch Place 1 patch onto the skin every 24 hours To prevent lidocaine toxicity, patient should be patch free for 12 hrs daily.      melatonin 3 MG tablet Take 3 mg by mouth At Bedtime      midodrine (PROAMATINE) 10 MG tablet Take 5 mg by mouth 2 times daily      pantoprazole (PROTONIX) 40 MG EC tablet Take 40 mg by mouth daily      psyllium (METAMUCIL/KONSYL) 58.6 % powder Take 6 g (1 teaspoonful) by mouth daily      sennosides (SENOKOT) 8.6 MG tablet Take 1 tablet by mouth 2 times daily      sodium chloride 1 GM tablet Take 1 g by mouth 2 times daily      traMADol (ULTRAM) 50 MG tablet Take 0.5 tablets (25 mg) by mouth every 6 hours as needed for severe pain Ok to pull 1 tab from E-kit 12 tablet 0     Medications reviewed:  Medications reconciled to facility chart and changes were made to reflect current medications as identified as above med list. Below are the changes that were made:   Medications stopped since last EPIC medication reconciliation:   There are no discontinued medications.    Medications started since last [facility] medication reconciliation:  No orders of the defined types were placed in this encounter.        REVIEW OF SYSTEMS:  4 point ROS neg other than the symptoms noted above in the HPI.      PHYSICAL EXAM:  BP (!) 170/90   Pulse 76   Temp 98.1  F (36.7  C)   Resp 18   Ht 1.626 m (5' 4\")   Wt 83.5 kg (184 lb)   SpO2 94%   BMI 31.58 kg/m    Physical Exam  Cardiovascular:      Rate and Rhythm: Normal rate and regular rhythm.      Heart sounds: Normal heart sounds.   Pulmonary:      Effort: Pulmonary effort is normal.      Breath sounds: Normal breath sounds.   Musculoskeletal:      Right lower leg: Edema present.      Left lower leg: Edema present.      Comments: Decreased ROM to left hip   Neurological:      Mental Status: She is alert.   Psychiatric:         Mood " and Affect: Mood normal.         Thought Content: Thought content normal.         ASSESSMENT / PLAN:  Aftercare following surgery of the musculoskeletal system  Closed 2-part intertrochanteric fracture of left femur with routine healing, subsequent encounter  Physical deconditioning  Secondary to fall. Surgery without complication. Did not tolerate narcotics as they caused confusion and orthostasis. Tried low dose tramadol in TCU due to severe pain, again caused lightheadedness (improved), some confusion, but she feel tolerable and only using tramadol rarely. Has been getting E-stim which she feels is effective. Had follow-up with ortho on 9/28, staples removed.  Did have some leg cramps last night, better this AM  - tramadol 25mg q6h PRN,  APAP 1000mg TID, lidocaine patches  - PT/OT, E-stim   - DVT ppx with ASA 81mg BID x 6 weeks   - follow-up with ortho  - will check BMP, Mg level given cramps     Primary hypertension  BP remains elevated 150-190s, asymptomatic  - increased amlodipine to 10mg at bedtime      Orthostatic hypotension  No further dizziness, has been tolerating therapy. BP remains elevated  - continue midodrine 5mg BID, hold for SBP >140. If no used over the weekend will consider stopping        Orders:  Increased amlodipine to 10mg at bedtime  BMP, MG on 10/23    Electronically signed by:  MARISOL Dean CNP

## 2023-10-20 ENCOUNTER — TRANSITIONAL CARE UNIT VISIT (OUTPATIENT)
Dept: GERIATRICS | Facility: CLINIC | Age: 88
End: 2023-10-20
Payer: MEDICARE

## 2023-10-20 VITALS
WEIGHT: 184 LBS | HEART RATE: 76 BPM | OXYGEN SATURATION: 94 % | BODY MASS INDEX: 31.41 KG/M2 | TEMPERATURE: 98.1 F | RESPIRATION RATE: 18 BRPM | DIASTOLIC BLOOD PRESSURE: 90 MMHG | HEIGHT: 64 IN | SYSTOLIC BLOOD PRESSURE: 170 MMHG

## 2023-10-20 DIAGNOSIS — Z47.89 AFTERCARE FOLLOWING SURGERY OF THE MUSCULOSKELETAL SYSTEM: Primary | ICD-10-CM

## 2023-10-20 DIAGNOSIS — I95.1 ORTHOSTATIC HYPOTENSION: ICD-10-CM

## 2023-10-20 DIAGNOSIS — I10 PRIMARY HYPERTENSION: ICD-10-CM

## 2023-10-20 DIAGNOSIS — R53.81 PHYSICAL DECONDITIONING: ICD-10-CM

## 2023-10-20 DIAGNOSIS — S72.142D CLOSED 2-PART INTERTROCHANTERIC FRACTURE OF LEFT FEMUR WITH ROUTINE HEALING, SUBSEQUENT ENCOUNTER: ICD-10-CM

## 2023-10-20 PROCEDURE — 99309 SBSQ NF CARE MODERATE MDM 30: CPT | Performed by: NURSE PRACTITIONER

## 2023-10-20 RX ORDER — AMLODIPINE BESYLATE 10 MG/1
10 TABLET ORAL AT BEDTIME
Start: 2023-10-20

## 2023-10-20 NOTE — LETTER
10/20/2023        RE: Malina Miller  [address] [address] [address]        [facility] GERIATRICS  ACUTE/EPISODIC VISIT    [facility] Medical Record Number: [id number]  Place of Service where encounter took place: [facility] ([facility]) [[id number]]    Chief Complaint   Patient presents with     RECHECK     HPI:    Malina Miller is a 89 year old (8/10/1932), who is being seen today for an episodic care visit. HPI information obtained from: facility chart records, facility staff, patient report, [facility] chart review, and Care Everywhere [facility] chart review.    Today's concern is: Recently hospitalized after fall due to orthostasis with left hip fracture, s/p ORIF. Was seen in the ED on 10/10 with increased confusion. Head CT negative for acute findings. UA concerning for UTI, so was started on Kelfex. She continued to have elevated BP despite dose decreased in midodrine and increasing Amlodipine. She has not received midodrine since Monday. Denies any dizziness or lightheadedness. She has been able to walk up to 80ft with therapy. Reports she did not sleep well last night due to leg cramps, they are resolved this AM. She did have a small BM this AM.     ALLERGIES:   Allergies   Allergen Reactions     Metformin Diarrhea     Spironolactone Headache     Hyponatremia      MEDICATIONS:  Post Discharge Medication Reconciliation Status: medication reconcilation previously completed during another office visit.     Current Outpatient Medications   Medication Sig Dispense Refill     amLODIPine (NORVASC) 10 MG tablet Take 1 tablet (10 mg) by mouth at bedtime       acetaminophen (TYLENOL) 500 MG tablet Take 1,000 mg by mouth 3 times daily       aspirin 81 MG EC tablet Take 81 mg by mouth 2 times daily (with meals)       atorvastatin (LIPITOR) 10 MG tablet Take 10 mg by mouth daily       carboxymethylcellulose PF (REFRESH PLUS) 0.5 % ophthalmic solution Place 1 drop into both  "eyes 4 times daily as needed for dry eyes       Folic Acid-Vit B6-Vit B12 2.3-24.5-2 MG TABS Take 1 tablet by mouth daily       levothyroxine (SYNTHROID/LEVOTHROID) 112 MCG tablet Take 112 mcg by mouth daily       Lidocaine (LIDOCARE) 4 % Patch Place 1 patch onto the skin every 24 hours To prevent lidocaine toxicity, patient should be patch free for 12 hrs daily.       melatonin 3 MG tablet Take 3 mg by mouth At Bedtime       midodrine (PROAMATINE) 10 MG tablet Take 5 mg by mouth 2 times daily       pantoprazole (PROTONIX) 40 MG EC tablet Take 40 mg by mouth daily       psyllium (METAMUCIL/KONSYL) 58.6 % powder Take 6 g (1 teaspoonful) by mouth daily       sennosides (SENOKOT) 8.6 MG tablet Take 1 tablet by mouth 2 times daily       sodium chloride 1 GM tablet Take 1 g by mouth 2 times daily       traMADol (ULTRAM) 50 MG tablet Take 0.5 tablets (25 mg) by mouth every 6 hours as needed for severe pain Ok to pull 1 tab from E-kit 12 tablet 0     Medications reviewed:  Medications reconciled to facility chart and changes were made to reflect current medications as identified as above med list. Below are the changes that were made:   Medications stopped since last EPIC medication reconciliation:   There are no discontinued medications.    Medications started since last [facility] medication reconciliation:  No orders of the defined types were placed in this encounter.        REVIEW OF SYSTEMS:  4 point ROS neg other than the symptoms noted above in the HPI.      PHYSICAL EXAM:  BP (!) 170/90   Pulse 76   Temp 98.1  F (36.7  C)   Resp 18   Ht 1.626 m (5' 4\")   Wt 83.5 kg (184 lb)   SpO2 94%   BMI 31.58 kg/m    Physical Exam  Cardiovascular:      Rate and Rhythm: Normal rate and regular rhythm.      Heart sounds: Normal heart sounds.   Pulmonary:      Effort: Pulmonary effort is normal.      Breath sounds: Normal breath sounds.   Musculoskeletal:      Right lower leg: Edema present.      Left lower leg: Edema present.    "   Comments: Decreased ROM to left hip   Neurological:      Mental Status: She is alert.   Psychiatric:         Mood and Affect: Mood normal.         Thought Content: Thought content normal.         ASSESSMENT / PLAN:  Aftercare following surgery of the musculoskeletal system  Closed 2-part intertrochanteric fracture of left femur with routine healing, subsequent encounter  Physical deconditioning  Secondary to fall. Surgery without complication. Did not tolerate narcotics as they caused confusion and orthostasis. Tried low dose tramadol in TCU due to severe pain, again caused lightheadedness (improved), some confusion, but she feel tolerable and only using tramadol rarely. Has been getting E-stim which she feels is effective. Had follow-up with ortho on 9/28, staples removed.  Did have some leg cramps last night, better this AM  - tramadol 25mg q6h PRN,  APAP 1000mg TID, lidocaine patches  - PT/OT, E-stim   - DVT ppx with ASA 81mg BID x 6 weeks   - follow-up with ortho  - will check BMP, Mg level given cramps     Primary hypertension  BP remains elevated 150-190s, asymptomatic  - increased amlodipine to 10mg at bedtime      Orthostatic hypotension  No further dizziness, has been tolerating therapy. BP remains elevated  - continue midodrine 5mg BID, hold for SBP >140. If no used over the weekend will consider stopping        Orders:  Increased amlodipine to 10mg at bedtime  BMP, MG on 10/23    Electronically signed by:  MARISOL Dean CNP      Sincerely,        MARISOL Dean CNP

## 2023-10-22 ENCOUNTER — LAB REQUISITION (OUTPATIENT)
Dept: LAB | Facility: CLINIC | Age: 88
End: 2023-10-22

## 2023-10-22 DIAGNOSIS — I10 ESSENTIAL (PRIMARY) HYPERTENSION: ICD-10-CM

## 2023-10-23 ENCOUNTER — TELEPHONE (OUTPATIENT)
Dept: GERIATRICS | Facility: CLINIC | Age: 88
End: 2023-10-23
Payer: MEDICARE

## 2023-10-23 LAB
ANION GAP SERPL CALCULATED.3IONS-SCNC: 9 MMOL/L (ref 7–15)
BUN SERPL-MCNC: 8.1 MG/DL (ref 8–23)
CALCIUM SERPL-MCNC: 9.8 MG/DL (ref 8.2–9.6)
CHLORIDE SERPL-SCNC: 96 MMOL/L (ref 98–107)
CREAT SERPL-MCNC: 0.58 MG/DL (ref 0.51–0.95)
DEPRECATED HCO3 PLAS-SCNC: 30 MMOL/L (ref 22–29)
EGFRCR SERPLBLD CKD-EPI 2021: 85 ML/MIN/1.73M2
GLUCOSE SERPL-MCNC: 131 MG/DL (ref 70–99)
MAGNESIUM SERPL-MCNC: 1.8 MG/DL (ref 1.7–2.3)
POTASSIUM SERPL-SCNC: 3.1 MMOL/L (ref 3.4–5.3)
SODIUM SERPL-SCNC: 135 MMOL/L (ref 135–145)

## 2023-10-23 PROCEDURE — 80048 BASIC METABOLIC PNL TOTAL CA: CPT | Performed by: NURSE PRACTITIONER

## 2023-10-23 PROCEDURE — 83735 ASSAY OF MAGNESIUM: CPT | Performed by: NURSE PRACTITIONER

## 2023-10-23 PROCEDURE — P9603 ONE-WAY ALLOW PRORATED MILES: HCPCS | Performed by: NURSE PRACTITIONER

## 2023-10-23 RX ORDER — POTASSIUM CHLORIDE 1500 MG/1
20 TABLET, EXTENDED RELEASE ORAL DAILY
COMMUNITY

## 2023-10-23 NOTE — PROGRESS NOTES
[facility] GERIATRICS  ACUTE/EPISODIC VISIT    [facility] Medical Record Number: [id number]  Place of Service where encounter took place: [facility] ([facility]) [[id number]]    Chief Complaint   Patient presents with    RECHECK     HPI:    Malina Miller is a 89 year old (8/10/1932), who is being seen today for an episodic care visit. HPI information obtained from: facility chart records, facility staff, patient report, [facility] chart review, and Care Everywhere [facility] chart review.    Today's concern is: Recently hospitalized after fall due to orthostasis with left hip fracture, s/p ORIF. Was seen in the ED on 10/10 with increased confusion. Head CT negative for acute findings. UA concerning for UTI, so was started on Kelfex. BP has been improving since dose increased in Amlodipine, has not received any midodrine in the past 5 days. Denies any dizziness with activity and BP has been stable in therapy. She denies any pain in her leg. Mobility remains limited. She has home visit with therapy tomorrow. Is meeting with senior linkage line later today. Continues to report difficulty with constipation.     ALLERGIES:   Allergies   Allergen Reactions    Metformin Diarrhea    Spironolactone Headache     Hyponatremia      MEDICATIONS:  Post Discharge Medication Reconciliation Status: medication reconcilation previously completed during another office visit.     Current Outpatient Medications   Medication Sig Dispense Refill    sennosides (SENOKOT) 8.6 MG tablet Take 2 tablets by mouth 2 times daily      acetaminophen (TYLENOL) 500 MG tablet Take 1,000 mg by mouth 3 times daily      amLODIPine (NORVASC) 10 MG tablet Take 1 tablet (10 mg) by mouth at bedtime      aspirin 81 MG EC tablet Take 81 mg by mouth 2 times daily (with meals)      atorvastatin (LIPITOR) 10 MG tablet Take 10 mg by mouth daily      carboxymethylcellulose PF (REFRESH PLUS) 0.5 % ophthalmic solution Place 1 drop into both eyes 4  "times daily as needed for dry eyes      Folic Acid-Vit B6-Vit B12 2.3-24.5-2 MG TABS Take 1 tablet by mouth daily      levothyroxine (SYNTHROID/LEVOTHROID) 112 MCG tablet Take 112 mcg by mouth daily      Lidocaine (LIDOCARE) 4 % Patch Place 1 patch onto the skin every 24 hours To prevent lidocaine toxicity, patient should be patch free for 12 hrs daily.      melatonin 3 MG tablet Take 3 mg by mouth At Bedtime      pantoprazole (PROTONIX) 40 MG EC tablet Take 40 mg by mouth daily      potassium chloride ER (K-TAB) 20 MEQ CR tablet Take 20 mEq by mouth daily      psyllium (METAMUCIL/KONSYL) 58.6 % powder Take 6 g (1 teaspoonful) by mouth daily      sodium chloride 1 GM tablet Take 1 g by mouth 2 times daily       Medications reviewed:  Medications reconciled to facility chart and changes were made to reflect current medications as identified as above med list. Below are the changes that were made:   Medications stopped since last EPIC medication reconciliation:   There are no discontinued medications.    Medications started since last [facility] medication reconciliation:  No orders of the defined types were placed in this encounter.        REVIEW OF SYSTEMS:  4 point ROS neg other than the symptoms noted above in the HPI.      PHYSICAL EXAM:  BP (!) 140/62   Pulse 72   Temp 98.2  F (36.8  C)   Resp 20   Ht 1.626 m (5' 4\")   Wt 79.5 kg (175 lb 4.8 oz)   SpO2 99%   BMI 30.09 kg/m    Physical Exam  Cardiovascular:      Rate and Rhythm: Normal rate and regular rhythm.      Heart sounds: Normal heart sounds.   Pulmonary:      Effort: Pulmonary effort is normal.      Breath sounds: Normal breath sounds.   Abdominal:      General: Bowel sounds are normal.      Palpations: Abdomen is soft.   Musculoskeletal:      Right lower leg: Edema present.      Left lower leg: Edema present.   Neurological:      General: No focal deficit present.      Mental Status: She is alert.   Psychiatric:         Mood and Affect: Mood normal.     "  Comments: Memory and judgement fair         ASSESSMENT / PLAN:  Aftercare following surgery of the musculoskeletal system  Closed 2-part intertrochanteric fracture of left femur with routine healing, subsequent encounter  Physical deconditioning  Secondary to fall. Surgery without complication. Did not tolerate narcotics as they caused confusion and orthostasis. Tried low dose tramadol in TCU due to severe pain, again caused lightheadedness (improved), some confusion, but she feel tolerable and only using tramadol rarely.  Had follow-up with ortho on 9/28, staples removed.  Mobility remains limites, only able to walk short distances.   - discontinue tramadol as not used in plast 8 days    -  APAP 1000mg TID, lidocaine patches  - PT/OT, home visit tomorrow   - DVT ppx with ASA 81mg BID x 6 weeks (10/30)  - follow-up with ortho    Primary hypertension  Orthostatic hypotension  NO further orthostasis, BP running 140-160, given falls and history or orthostasis likely adequate control  - discontinue midodrine  - continue amlodipine 10mg at bedtime  - monitor and adjust     Slow transit constipation  - continue metamucil  - sennosides (SENOKOT) 8.6 MG tablet; Take 2 tablets by mouth 2 times daily  - monitor and adjust    Hypokalemia  - started K-dur 20 meq daily  - recheck k+ on 10/27    Orders:  Discontinue midodrine, tramadol  Discontinue BG checks  Increase senna to 2 tab BID     Electronically signed by:  MARISOL Dean CNP

## 2023-10-23 NOTE — TELEPHONE ENCOUNTER
Orders relayed to facility nurse, Evon.       ----- Message from MARISOL Mac CNP sent at 10/23/2023 12:47 PM CDT -----  Let's start K-dur 20 meq PO daily. Recheck k+ on 10/27. Thanks!    Olinda

## 2023-10-24 ENCOUNTER — TRANSITIONAL CARE UNIT VISIT (OUTPATIENT)
Dept: GERIATRICS | Facility: CLINIC | Age: 88
End: 2023-10-24
Payer: MEDICARE

## 2023-10-24 VITALS
BODY MASS INDEX: 29.93 KG/M2 | HEART RATE: 72 BPM | RESPIRATION RATE: 20 BRPM | SYSTOLIC BLOOD PRESSURE: 140 MMHG | HEIGHT: 64 IN | TEMPERATURE: 98.2 F | WEIGHT: 175.3 LBS | DIASTOLIC BLOOD PRESSURE: 62 MMHG | OXYGEN SATURATION: 99 %

## 2023-10-24 DIAGNOSIS — S72.142D CLOSED 2-PART INTERTROCHANTERIC FRACTURE OF LEFT FEMUR WITH ROUTINE HEALING, SUBSEQUENT ENCOUNTER: ICD-10-CM

## 2023-10-24 DIAGNOSIS — I95.1 ORTHOSTATIC HYPOTENSION: ICD-10-CM

## 2023-10-24 DIAGNOSIS — I10 PRIMARY HYPERTENSION: ICD-10-CM

## 2023-10-24 DIAGNOSIS — E87.6 HYPOKALEMIA: ICD-10-CM

## 2023-10-24 DIAGNOSIS — K59.01 SLOW TRANSIT CONSTIPATION: ICD-10-CM

## 2023-10-24 DIAGNOSIS — R53.81 PHYSICAL DECONDITIONING: ICD-10-CM

## 2023-10-24 DIAGNOSIS — Z47.89 AFTERCARE FOLLOWING SURGERY OF THE MUSCULOSKELETAL SYSTEM: Primary | ICD-10-CM

## 2023-10-24 PROCEDURE — 99309 SBSQ NF CARE MODERATE MDM 30: CPT | Performed by: NURSE PRACTITIONER

## 2023-10-24 RX ORDER — SENNOSIDES 8.6 MG
2 TABLET ORAL 2 TIMES DAILY
Start: 2023-10-24

## 2023-10-24 NOTE — LETTER
10/24/2023        RE: Malina Miller  [address] [address] [id number]        [facility] GERIATRICS  ACUTE/EPISODIC VISIT    [facility] Medical Record Number: [id number]  Place of Service where encounter took place: [facility] ([facility]) [[id number]]    Chief Complaint   Patient presents with     RECHECK     HPI:    Malina Miller is a 89 year old (8/10/1932), who is being seen today for an episodic care visit. HPI information obtained from: facility chart records, facility staff, patient report, [facility] chart review, and Care Everywhere [facility] chart review.    Today's concern is: Recently hospitalized after fall due to orthostasis with left hip fracture, s/p ORIF. Was seen in the ED on 10/10 with increased confusion. Head CT negative for acute findings. UA concerning for UTI, so was started on Kelfex. BP has been improving since dose increased in Amlodipine, has not received any midodrine in the past 5 days. Denies any dizziness with activity and BP has been stable in therapy. She denies any pain in her leg. Mobility remains limited. She has home visit with therapy tomorrow. Is meeting with senior linkage line later today. Continues to report difficulty with constipation.     ALLERGIES:   Allergies   Allergen Reactions     Metformin Diarrhea     Spironolactone Headache     Hyponatremia      MEDICATIONS:  Post Discharge Medication Reconciliation Status: medication reconcilation previously completed during another office visit.     Current Outpatient Medications   Medication Sig Dispense Refill     sennosides (SENOKOT) 8.6 MG tablet Take 2 tablets by mouth 2 times daily       acetaminophen (TYLENOL) 500 MG tablet Take 1,000 mg by mouth 3 times daily       amLODIPine (NORVASC) 10 MG tablet Take 1 tablet (10 mg) by mouth at bedtime       aspirin 81 MG EC tablet Take 81 mg by mouth 2 times daily (with meals)       atorvastatin (LIPITOR) 10 MG tablet Take 10 mg by mouth  "daily       carboxymethylcellulose PF (REFRESH PLUS) 0.5 % ophthalmic solution Place 1 drop into both eyes 4 times daily as needed for dry eyes       Folic Acid-Vit B6-Vit B12 2.3-24.5-2 MG TABS Take 1 tablet by mouth daily       levothyroxine (SYNTHROID/LEVOTHROID) 112 MCG tablet Take 112 mcg by mouth daily       Lidocaine (LIDOCARE) 4 % Patch Place 1 patch onto the skin every 24 hours To prevent lidocaine toxicity, patient should be patch free for 12 hrs daily.       melatonin 3 MG tablet Take 3 mg by mouth At Bedtime       pantoprazole (PROTONIX) 40 MG EC tablet Take 40 mg by mouth daily       potassium chloride ER (K-TAB) 20 MEQ CR tablet Take 20 mEq by mouth daily       psyllium (METAMUCIL/KONSYL) 58.6 % powder Take 6 g (1 teaspoonful) by mouth daily       sodium chloride 1 GM tablet Take 1 g by mouth 2 times daily       Medications reviewed:  Medications reconciled to facility chart and changes were made to reflect current medications as identified as above med list. Below are the changes that were made:   Medications stopped since last EPIC medication reconciliation:   There are no discontinued medications.    Medications started since last [facility] medication reconciliation:  No orders of the defined types were placed in this encounter.        REVIEW OF SYSTEMS:  4 point ROS neg other than the symptoms noted above in the HPI.      PHYSICAL EXAM:  BP (!) 140/62   Pulse 72   Temp 98.2  F (36.8  C)   Resp 20   Ht 1.626 m (5' 4\")   Wt 79.5 kg (175 lb 4.8 oz)   SpO2 99%   BMI 30.09 kg/m    Physical Exam  Cardiovascular:      Rate and Rhythm: Normal rate and regular rhythm.      Heart sounds: Normal heart sounds.   Pulmonary:      Effort: Pulmonary effort is normal.      Breath sounds: Normal breath sounds.   Abdominal:      General: Bowel sounds are normal.      Palpations: Abdomen is soft.   Musculoskeletal:      Right lower leg: Edema present.      Left lower leg: Edema present.   Neurological:      General: " No focal deficit present.      Mental Status: She is alert.   Psychiatric:         Mood and Affect: Mood normal.      Comments: Memory and judgement fair         ASSESSMENT / PLAN:  Aftercare following surgery of the musculoskeletal system  Closed 2-part intertrochanteric fracture of left femur with routine healing, subsequent encounter  Physical deconditioning  Secondary to fall. Surgery without complication. Did not tolerate narcotics as they caused confusion and orthostasis. Tried low dose tramadol in TCU due to severe pain, again caused lightheadedness (improved), some confusion, but she feel tolerable and only using tramadol rarely.  Had follow-up with ortho on 9/28, staples removed.  Mobility remains limites, only able to walk short distances.   - discontinue tramadol as not used in plast 8 days    -  APAP 1000mg TID, lidocaine patches  - PT/OT, home visit tomorrow   - DVT ppx with ASA 81mg BID x 6 weeks (10/30)  - follow-up with ortho    Primary hypertension  Orthostatic hypotension  NO further orthostasis, BP running 140-160, given falls and history or orthostasis likely adequate control  - discontinue midodrine  - continue amlodipine 10mg at bedtime  - monitor and adjust     Slow transit constipation  - continue metamucil  - sennosides (SENOKOT) 8.6 MG tablet; Take 2 tablets by mouth 2 times daily  - monitor and adjust    Hypokalemia  - started K-dur 20 meq daily  - recheck k+ on 10/27    Orders:  Discontinue midodrine, tramadol  Discontinue BG checks  Increase senna to 2 tab BID     Electronically signed by:  MARISOL Dean CNP      Sincerely,        MARISOL Dean CNP

## 2023-10-26 ENCOUNTER — LAB REQUISITION (OUTPATIENT)
Dept: LAB | Facility: CLINIC | Age: 88
End: 2023-10-26

## 2023-10-26 DIAGNOSIS — E87.6 HYPOKALEMIA: ICD-10-CM

## 2023-10-27 LAB — POTASSIUM SERPL-SCNC: 3.3 MMOL/L (ref 3.4–5.3)

## 2023-10-27 PROCEDURE — 84132 ASSAY OF SERUM POTASSIUM: CPT | Performed by: NURSE PRACTITIONER

## 2023-10-27 PROCEDURE — P9603 ONE-WAY ALLOW PRORATED MILES: HCPCS | Performed by: NURSE PRACTITIONER

## 2023-11-07 ENCOUNTER — DISCHARGE SUMMARY NURSING HOME (OUTPATIENT)
Dept: GERIATRICS | Facility: CLINIC | Age: 88
End: 2023-11-07
Payer: MEDICARE

## 2023-11-07 VITALS
RESPIRATION RATE: 20 BRPM | BODY MASS INDEX: 29.64 KG/M2 | TEMPERATURE: 98.3 F | WEIGHT: 173.6 LBS | DIASTOLIC BLOOD PRESSURE: 83 MMHG | HEART RATE: 78 BPM | OXYGEN SATURATION: 99 % | HEIGHT: 64 IN | SYSTOLIC BLOOD PRESSURE: 154 MMHG

## 2023-11-07 DIAGNOSIS — G47.33 OSA TREATED WITH BIPAP: ICD-10-CM

## 2023-11-07 DIAGNOSIS — K59.01 SLOW TRANSIT CONSTIPATION: ICD-10-CM

## 2023-11-07 DIAGNOSIS — D62 ABLA (ACUTE BLOOD LOSS ANEMIA): ICD-10-CM

## 2023-11-07 DIAGNOSIS — E78.00 PURE HYPERCHOLESTEROLEMIA: ICD-10-CM

## 2023-11-07 DIAGNOSIS — E87.6 HYPOKALEMIA: ICD-10-CM

## 2023-11-07 DIAGNOSIS — R53.81 PHYSICAL DECONDITIONING: ICD-10-CM

## 2023-11-07 DIAGNOSIS — I10 PRIMARY HYPERTENSION: ICD-10-CM

## 2023-11-07 DIAGNOSIS — S72.142D CLOSED 2-PART INTERTROCHANTERIC FRACTURE OF LEFT FEMUR WITH ROUTINE HEALING, SUBSEQUENT ENCOUNTER: ICD-10-CM

## 2023-11-07 DIAGNOSIS — Z47.89 AFTERCARE FOLLOWING SURGERY OF THE MUSCULOSKELETAL SYSTEM: Primary | ICD-10-CM

## 2023-11-07 DIAGNOSIS — E11.40 TYPE 2 DIABETES MELLITUS WITH DIABETIC NEUROPATHY, WITHOUT LONG-TERM CURRENT USE OF INSULIN (H): ICD-10-CM

## 2023-11-07 DIAGNOSIS — G30.1 MILD LATE ONSET ALZHEIMER'S DEMENTIA WITHOUT BEHAVIORAL DISTURBANCE, PSYCHOTIC DISTURBANCE, MOOD DISTURBANCE, OR ANXIETY (H): ICD-10-CM

## 2023-11-07 DIAGNOSIS — I95.1 ORTHOSTATIC HYPOTENSION: ICD-10-CM

## 2023-11-07 DIAGNOSIS — N30.00 ACUTE CYSTITIS WITHOUT HEMATURIA: ICD-10-CM

## 2023-11-07 DIAGNOSIS — U07.1 INFECTION DUE TO 2019 NOVEL CORONAVIRUS: ICD-10-CM

## 2023-11-07 DIAGNOSIS — E03.9 HYPOTHYROIDISM, UNSPECIFIED TYPE: ICD-10-CM

## 2023-11-07 DIAGNOSIS — F02.A0 MILD LATE ONSET ALZHEIMER'S DEMENTIA WITHOUT BEHAVIORAL DISTURBANCE, PSYCHOTIC DISTURBANCE, MOOD DISTURBANCE, OR ANXIETY (H): ICD-10-CM

## 2023-11-07 PROCEDURE — 99316 NF DSCHRG MGMT 30 MIN+: CPT | Performed by: NURSE PRACTITIONER

## 2023-11-07 NOTE — PROGRESS NOTES
[facility] GERIATRICS DISCHARGE SUMMARY  Patient Name: Malina Miller  YOB: 1932  [place] Medical Record Number: [id number]  Place of Service Where Encounter Took Place: [facility] ([facility]) [[id number]]    PRIMARY CARE PROVIDER AND CLINIC RESPONSIBLE AFTER TRANSFER: Cici Nina, [address] [facility] [address]; Non-[facility] Provider     Transferring providers: MARISOL Small CNP; Jaclyn Cruz MD  Recent Hospitalization/ED: [facility]  stay 9/9/23 to 9/18/23.  Date of SNF Admission: September 18, 2023  Date of SNF (anticipated) Discharge: November 09, 2023  Discharged to: new assisted living for patient [facility]  Cognitive Scores: SLUMS: 20/30 and CPT: 4.6/5.6  Physical Function:  SBA with transfers,  ambulate 100ft with 2WW, CGA  DME: Walker, Wheelchair     CODE STATUS/ADVANCE DIRECTIVES DISCUSSION: No CPR- Do NOT Intubate   ALLERGIES: Metformin and Spironolactone    NURSING FACILITY COURSE:  Medication Changes/Rationale:   Midodrine stopped due to HTN  Started on amlodipine for HTN  Started on senna for constipation   Stopped tramadol, lidocaine as hip pain improved.   Started on K-dur for hypokalemia    Summary of nursing facility stay:   Brief Hospital Course: PMH of hypothyroidism, neuropathy, DM2, RAJANI, CAD, HTN,  who presented after a fall. Found to have left intertrochanteric femur fracture. S/p nailing on 9/10/23. Had significant orthostatic hypotension, all Lasix, losartan, amlodipine stopped and she was started on midodrine. Narcotics stopped. Started on salt tabs for hyponatremia. Had episode of syncope after a BM, no further episodes. Had post-op anemia, was transfused 2 units PRBC and was started on Foltx. Glipizide discontinued. Was noted to be significantly deconditioned and TCU was recommended. When medically stable was discharged to [facility] for further rehab and medical management.      Was seen in the ED on 10/10 with increased  confusion. Head CT negative for acute findings. UA concerning for UTI, so was started on Kelfex. Also had noted hypertension so was started on low dose amlodipine and discharged back to TCU.     Aftercare following surgery of the musculoskeletal system  Closed 2-part intertrochanteric fracture of left femur with routine healing, subsequent encounter  Physical deconditioning  Secondary to fall. Surgery without complication. Did not tolerate narcotics as they caused confusion and orthostasis. Tried low dose tramadol in TCU due to severe pain, again caused lightheadedness (improved), some confusion, but she feel tolerable. Used only rarely so was discontinued.  Had follow-up with ortho on 9/28, staples removed. Completed 6 weeks of ASA on 10/30. Had home visit with therapy last week, did not go well, so patient is planning to discharge to new [facility] with her spouse.   -  APAP 1000mg TID,   - PT/OT outpatient and new MELBA   - follow-up with ortho    Primary hypertension  Orthostatic hypotension  Had significant orthostasis on hospital admission, felt likely cause of fall. Started on Midodrine 20mg BID, Developed significant HTN in TCU, up to the 200s she was weaned off midodrine and started on amlodipine at bedtime. BP now running 140-160s, which is likely appropriate control given history of orthostasis.   - continue amlodipine 10mg at bedtime   - follow-up with PCP    Slow transit constipation  New on senna  - continue metamucil  - sennosides (SENOKOT) 8.6 MG tablet; Take 2 tablets by mouth 2 times daily    Hypokalemia  - continue K-dur 20 mew daily     Acute cystitis without hematuria  Completed course of Keflex, no further symptoms.     Infection due to 2019 novel coronavirus  + on 10/3; mildly symptomatic, completed course of molnupiravir.     ABLA (acute blood loss anemia)  Stable in TCU    Type 2 diabetes mellitus with diabetic neuropathy, without long-term current use of insulin (H)  A1C 5.5% 7/2023. Glipizide  stopped while IP. Bg controlled 140-180 so BG checks stopped     RAJANI treated with BiPAP  - continue BiPAP at home settings     Mild late onset Alzheimer's dementia without behavioral disturbance, psychotic disturbance, mood disturbance, or anxiety (H)  SLUMS 20/30 which indicated dementia, CPT 4.6/5.6 which recommends she may live along with daily assistance, supervision with medications. Due to increased care needs she has opted to move into [facility].     Pure hypercholesterolemia  - continue atorvastatin    Hypothyroidism, unspecified type  - continue levothyroxine.     Discharge Medications:  MED REC REQUIRED  Post Medication Reconciliation Status: medication reconcilation previously completed during another office visit    Current Outpatient Medications   Medication Sig Dispense Refill    acetaminophen (TYLENOL) 500 MG tablet Take 1,000 mg by mouth 3 times daily      amLODIPine (NORVASC) 10 MG tablet Take 1 tablet (10 mg) by mouth at bedtime      atorvastatin (LIPITOR) 10 MG tablet Take 10 mg by mouth daily      carboxymethylcellulose PF (REFRESH PLUS) 0.5 % ophthalmic solution Place 1 drop into both eyes 4 times daily as needed for dry eyes      Folic Acid-Vit B6-Vit B12 2.3-24.5-2 MG TABS Take 1 tablet by mouth daily      levothyroxine (SYNTHROID/LEVOTHROID) 112 MCG tablet Take 112 mcg by mouth daily      melatonin 3 MG tablet Take 3 mg by mouth At Bedtime      pantoprazole (PROTONIX) 40 MG EC tablet Take 40 mg by mouth daily      potassium chloride ER (K-TAB) 20 MEQ CR tablet Take 20 mEq by mouth daily      psyllium (METAMUCIL/KONSYL) 58.6 % powder Take 6 g (1 teaspoonful) by mouth daily      sennosides (SENOKOT) 8.6 MG tablet Take 2 tablets by mouth 2 times daily      sodium chloride 1 GM tablet Take 1 g by mouth 2 times daily       Controlled medications:   not applicable/none     Past Medical History: No past medical history on file.  Physical Exam:   Vitals: BP (!) 154/83   Pulse 78   Temp 98.3  " F (36.8  C)   Resp 20   Ht 1.626 m (5' 4\")   Wt 78.7 kg (173 lb 9.6 oz)   SpO2 99%   BMI 29.80 kg/m    BMI: Body mass index is 29.8 kg/m .  GENERAL APPEARANCE:  Alert, in no distress, cooperative,   RESP:  lungs clear to auscultation , no respiratory distress   CV:  regular rate and rhythm, no murmur, rub, or gallop, 1+ BLE edema  ABDOMEN:  bowel sounds normal,   M/S:   decreased ROM to left hip  NEURO:   Cn 2-12 grossly intact,   PSYCH:  affect and mood normal, forgetful       SNF Labs: Recent labs in [facility] reviewed by me today.  and Most Recent 3 CBC's:No lab results found.  Most Recent 3 BMP's:  Recent Labs   Lab Test 10/27/23  0900 10/23/23  0900 09/25/23  0815   NA  --  135 137   POTASSIUM 3.3* 3.1* 3.2*   CHLORIDE  --  96* 99   CO2  --  30* 24   BUN  --  8.1 10.0   CR  --  0.58 0.59   ANIONGAP  --  9 14   KHADIJAH  --  9.8* 9.1   GLC  --  131* 127*       DISCHARGE PLAN:  Follow up labs: No labs orders/due  Medical Follow Up:   Follow up with primary care provider in 2 weeks  Follow up with specialist ortho   [facility] scheduled appointments: None.  Discharge Services: Out Patient: Physical Therapy and Occupational Therapy.  Discharge Instructions Verbalized to Patient at Discharge:   Weight bearing restrictions:  Weight bearing as tolerated.   OK to shower but no bathing or soaking until approved by surgeon.   BiPAP when sleeping; settings per usual practice.     TOTAL DISCHARGE TIME: Greater than 30 minutes  Electronically signed by:  MARISOL Dean CNP      "

## 2023-11-07 NOTE — LETTER
11/7/2023        RE: Malina Miller  [address] [place] [address]        [facility] GERIATRICS DISCHARGE SUMMARY  Patient Name: Malina Miller  YOB: 1932  [facility] Medical Record Number: [id number]  Place of Service Where Encounter Took Place: [facility] (U) [[id number]]    PRIMARY CARE PROVIDER AND CLINIC RESPONSIBLE AFTER TRANSFER: Cici Nina, [address] [facility] [address]; Non-[facility] Provider     Transferring providers: MARISOL Small CNP; Jaclyn Cruz MD  Recent Hospitalization/ED: [facility]  stay 9/9/23 to 9/18/23.  Date of SNF Admission: September 18, 2023  Date of SNF (anticipated) Discharge: November 09, 2023  Discharged to: new assisted living for patient [facility]  Cognitive Scores: SLUMS: 20/30 and CPT: 4.6/5.6  Physical Function:  SBA with transfers,  ambulate 100ft with 2WW, CGA  DME: Walker, Wheelchair     CODE STATUS/ADVANCE DIRECTIVES DISCUSSION: No CPR- Do NOT Intubate   ALLERGIES: Metformin and Spironolactone    NURSING FACILITY COURSE:  Medication Changes/Rationale:   Midodrine stopped due to HTN  Started on amlodipine for HTN  Started on senna for constipation   Stopped tramadol, lidocaine as hip pain improved.   Started on K-dur for hypokalemia    Summary of nursing facility stay:   Brief Hospital Course: PMH of hypothyroidism, neuropathy, DM2, RAJANI, CAD, HTN,  who presented after a fall. Found to have left intertrochanteric femur fracture. S/p nailing on 9/10/23. Had significant orthostatic hypotension, all Lasix, losartan, amlodipine stopped and she was started on midodrine. Narcotics stopped. Started on salt tabs for hyponatremia. Had episode of syncope after a BM, no further episodes. Had post-op anemia, was transfused 2 units PRBC and was started on Foltx. Glipizide discontinued. Was noted to be significantly deconditioned and TCU was recommended. When medically stable was  discharged to TCU for further rehab and medical management.      Was seen in the ED on 10/10 with increased confusion. Head CT negative for acute findings. UA concerning for UTI, so was started on Kelfex. Also had noted hypertension so was started on low dose amlodipine and discharged back to TCU.     Aftercare following surgery of the musculoskeletal system  Closed 2-part intertrochanteric fracture of left femur with routine healing, subsequent encounter  Physical deconditioning  Secondary to fall. Surgery without complication. Did not tolerate narcotics as they caused confusion and orthostasis. Tried low dose tramadol in TCU due to severe pain, again caused lightheadedness (improved), some confusion, but she feel tolerable. Used only rarely so was discontinued.  Had follow-up with ortho on 9/28, staples removed. Completed 6 weeks of ASA on 10/30. Had home visit with therapy last week, did not go well, so patient is planning to discharge to [facility] with her spouse.   -  APAP 1000mg TID,   - PT/OT outpatient and new MELBA   - follow-up with ortho    Primary hypertension  Orthostatic hypotension  Had significant orthostasis on hospital admission, felt likely cause of fall. Started on Midodrine 20mg BID, Developed significant HTN in TCU, up to the 200s she was weaned off midodrine and started on amlodipine at bedtime. BP now running 140-160s, which is likely appropriate control given history of orthostasis.   - continue amlodipine 10mg at bedtime   - follow-up with PCP    Slow transit constipation  New on senna  - continue metamucil  - sennosides (SENOKOT) 8.6 MG tablet; Take 2 tablets by mouth 2 times daily    Hypokalemia  - continue K-dur 20 mew daily     Acute cystitis without hematuria  Completed course of Keflex, no further symptoms.     Infection due to 2019 novel coronavirus  + on 10/3; mildly symptomatic, completed course of molnupiravir.     ABLA (acute blood loss anemia)  Stable in TCU    Type 2 diabetes  mellitus with diabetic neuropathy, without long-term current use of insulin (H)  A1C 5.5% 7/2023. Glipizide stopped while IP. Bg controlled 140-180 so BG checks stopped     RAJANI treated with BiPAP  - continue BiPAP at home settings     Mild late onset Alzheimer's dementia without behavioral disturbance, psychotic disturbance, mood disturbance, or anxiety (H)  SLUMS 20/30 which indicated dementia, CPT 4.6/5.6 which recommends she may live along with daily assistance, supervision with medications. Due to increased care needs she has opted to move into [facility] [place].     Pure hypercholesterolemia  - continue atorvastatin    Hypothyroidism, unspecified type  - continue levothyroxine.     Discharge Medications:  MED REC REQUIRED  Post Medication Reconciliation Status: medication reconcilation previously completed during another office visit    Current Outpatient Medications   Medication Sig Dispense Refill     acetaminophen (TYLENOL) 500 MG tablet Take 1,000 mg by mouth 3 times daily       amLODIPine (NORVASC) 10 MG tablet Take 1 tablet (10 mg) by mouth at bedtime       atorvastatin (LIPITOR) 10 MG tablet Take 10 mg by mouth daily       carboxymethylcellulose PF (REFRESH PLUS) 0.5 % ophthalmic solution Place 1 drop into both eyes 4 times daily as needed for dry eyes       Folic Acid-Vit B6-Vit B12 2.3-24.5-2 MG TABS Take 1 tablet by mouth daily       levothyroxine (SYNTHROID/LEVOTHROID) 112 MCG tablet Take 112 mcg by mouth daily       melatonin 3 MG tablet Take 3 mg by mouth At Bedtime       pantoprazole (PROTONIX) 40 MG EC tablet Take 40 mg by mouth daily       potassium chloride ER (K-TAB) 20 MEQ CR tablet Take 20 mEq by mouth daily       psyllium (METAMUCIL/KONSYL) 58.6 % powder Take 6 g (1 teaspoonful) by mouth daily       sennosides (SENOKOT) 8.6 MG tablet Take 2 tablets by mouth 2 times daily       sodium chloride 1 GM tablet Take 1 g by mouth 2 times daily       Controlled medications:   not  "applicable/none     Past Medical History: No past medical history on file.  Physical Exam:   Vitals: BP (!) 154/83   Pulse 78   Temp 98.3  F (36.8  C)   Resp 20   Ht 1.626 m (5' 4\")   Wt 78.7 kg (173 lb 9.6 oz)   SpO2 99%   BMI 29.80 kg/m    BMI: Body mass index is 29.8 kg/m .  GENERAL APPEARANCE:  Alert, in no distress, cooperative,   RESP:  lungs clear to auscultation , no respiratory distress   CV:  regular rate and rhythm, no murmur, rub, or gallop, 1+ BLE edema  ABDOMEN:  bowel sounds normal,   M/S:   decreased ROM to left hip  NEURO:   Cn 2-12 grossly intact,   PSYCH:  affect and mood normal, forgetful       SNF Labs: Recent labs in [facility] reviewed by me today.  and Most Recent 3 CBC's:No lab results found.  Most Recent 3 BMP's:  Recent Labs   Lab Test 10/27/23  0900 10/23/23  0900 09/25/23  0815   NA  --  135 137   POTASSIUM 3.3* 3.1* 3.2*   CHLORIDE  --  96* 99   CO2  --  30* 24   BUN  --  8.1 10.0   CR  --  0.58 0.59   ANIONGAP  --  9 14   KHADIJAH  --  9.8* 9.1   GLC  --  131* 127*       DISCHARGE PLAN:  Follow up labs: No labs orders/due  Medical Follow Up:   Follow up with primary care provider in 2 weeks  Follow up with specialist ortho   [facility] scheduled appointments: None.  Discharge Services: Out Patient: Physical Therapy and Occupational Therapy.  Discharge Instructions Verbalized to Patient at Discharge:   Weight bearing restrictions:  Weight bearing as tolerated.   OK to shower but no bathing or soaking until approved by surgeon.   BiPAP when sleeping; settings per usual practice.     TOTAL DISCHARGE TIME: Greater than 30 minutes  Electronically signed by:  MARISOL Dean CNP        Sincerely,        MARISOL Dean CNP      "

## 2025-07-24 ENCOUNTER — DOCUMENTATION ONLY (OUTPATIENT)
Dept: GERIATRICS | Facility: CLINIC | Age: OVER 89
End: 2025-07-24
Payer: MEDICARE

## 2025-07-24 PROBLEM — E66.3 OVERWEIGHT (BMI 25.0-29.9): Status: ACTIVE | Noted: 2023-09-18

## 2025-07-24 PROBLEM — S32.049A CLOSED FRACTURE OF FOURTH LUMBAR VERTEBRA (H): Status: ACTIVE | Noted: 2025-07-20

## 2025-07-24 PROBLEM — R40.4 UNRESPONSIVE EPISODE: Status: ACTIVE | Noted: 2024-11-15

## 2025-07-24 PROBLEM — N18.31 STAGE 3A CHRONIC KIDNEY DISEASE (H): Status: ACTIVE | Noted: 2025-05-03

## 2025-07-24 PROBLEM — R11.2 NAUSEA & VOMITING: Status: ACTIVE | Noted: 2025-07-19

## 2025-07-24 NOTE — PROGRESS NOTES
[facility] GERIATRICS  INITIAL VISIT NOTE      PRIMARY CARE PROVIDER AND CLINIC: Edwige Avelar [facility]IC [address] [address]    [facility] Medical Record Number: [id number]  Place of Service where encounter took place: [facility] ([facility]) [[id number]]    Chief Complaint   Patient presents with    Hospital F/U     [facility] 7/18/2025 - 7/24/2024     HPI:    Malina Miller is a 89 year old (8/10/1932) female was admitted to the above facility from [facility]. Hospital stay 7/18/25 through 7/24/25 where they were admitted for lumbar fracture. Now admitted to this facility for rehab, medical management, and nursing care.      History obtained from: facility chart records, facility staff, patient report, [facility] chart review, and Care Everywhere [facility] chart review.      Brief Hospital Course: PMH of RAJANI, HTN, DM2, CAD, hypothyroidism, HLD who presented with headache. MRI brain consistent with NPH. Neuro consulted, not felt to be candidate for shunt. Amlodipine discontinued due to orthostatic blood pressure. Imaging showed age indeterminate L4 compression fracture, neurosurgery recommended against bracing.  Vitamin D level low, started on replacement. Started on alendronate for osteoporosis. When medically stable was discharged to U for further rehab and medical management.       U Course: ***    CODE STATUS/ADVANCE DIRECTIVES: {CODE STATUS:331645}    ALLERGIES:  Allergies   Allergen Reactions    Metformin Diarrhea    Spironolactone Headache     Hyponatremia       PAST MEDICAL HISTORY:   No past medical history on file.  PAST SURGICAL HISTORY:   No past surgical history on file.  FAMILY HISTORY:   No family history on file.  Unable to review due to cognitive impairment***    SOCIAL HISTORY:   Patient's living condition: lives with spouse    MEDICATIONS  Post Discharge Medication Reconciliation Status: {ACO Med Rec  (Provider):481961}.  Current Outpatient Medications   Medication Sig Dispense Refill    acetaminophen (TYLENOL) 500 MG tablet Take 1,000 mg by mouth 3 times daily      amLODIPine (NORVASC) 10 MG tablet Take 1 tablet (10 mg) by mouth at bedtime      atorvastatin (LIPITOR) 10 MG tablet Take 10 mg by mouth daily      carboxymethylcellulose PF (REFRESH PLUS) 0.5 % ophthalmic solution Place 1 drop into both eyes 4 times daily as needed for dry eyes      Folic Acid-Vit B6-Vit B12 2.3-24.5-2 MG TABS Take 1 tablet by mouth daily      levothyroxine (SYNTHROID/LEVOTHROID) 112 MCG tablet Take 112 mcg by mouth daily      melatonin 3 MG tablet Take 3 mg by mouth At Bedtime      pantoprazole (PROTONIX) 40 MG EC tablet Take 40 mg by mouth daily      potassium chloride ER (K-TAB) 20 MEQ CR tablet Take 20 mEq by mouth daily      psyllium (METAMUCIL/KONSYL) 58.6 % powder Take 6 g (1 teaspoonful) by mouth daily      sennosides (SENOKOT) 8.6 MG tablet Take 2 tablets by mouth 2 times daily      sodium chloride 1 GM tablet Take 1 g by mouth 2 times daily       ROS:  10 point ROS neg other than the symptoms noted above in the HPI.***  Unable to obtain due to cognitive impairment or aphasia  ROS    PHYSICAL EXAM:  There were no vitals taken for this visit.  Physical Exam     LABORATORY/IMAGING DATA:  Reviewed as per [facility] and/or [facility]    ASSESSMENT/PLAN:  {FGS DX INITIAL:324886}    Orders:   ***    {FGS TIME SPENT:495700}    Electronically signed by:  Adilia Bolden

## 2025-07-25 ENCOUNTER — TRANSITIONAL CARE UNIT VISIT (OUTPATIENT)
Dept: GERIATRICS | Facility: CLINIC | Age: OVER 89
End: 2025-07-25
Payer: MEDICARE

## 2025-07-25 DIAGNOSIS — E03.9 HYPOTHYROIDISM, UNSPECIFIED TYPE: ICD-10-CM

## 2025-07-25 DIAGNOSIS — G30.1 MILD LATE ONSET ALZHEIMER'S DEMENTIA WITHOUT BEHAVIORAL DISTURBANCE, PSYCHOTIC DISTURBANCE, MOOD DISTURBANCE, OR ANXIETY (H): ICD-10-CM

## 2025-07-25 DIAGNOSIS — R53.81 PHYSICAL DECONDITIONING: ICD-10-CM

## 2025-07-25 DIAGNOSIS — S32.040D CLOSED COMPRESSION FRACTURE OF L4 LUMBAR VERTEBRA, WITH ROUTINE HEALING, SUBSEQUENT ENCOUNTER: ICD-10-CM

## 2025-07-25 DIAGNOSIS — I95.1 ORTHOSTATIC HYPOTENSION: ICD-10-CM

## 2025-07-25 DIAGNOSIS — F02.A0 MILD LATE ONSET ALZHEIMER'S DEMENTIA WITHOUT BEHAVIORAL DISTURBANCE, PSYCHOTIC DISTURBANCE, MOOD DISTURBANCE, OR ANXIETY (H): ICD-10-CM

## 2025-07-25 DIAGNOSIS — I10 PRIMARY HYPERTENSION: ICD-10-CM

## 2025-07-25 DIAGNOSIS — G91.2 NPH (NORMAL PRESSURE HYDROCEPHALUS) (H): Primary | ICD-10-CM

## 2025-07-25 DIAGNOSIS — E11.40 TYPE 2 DIABETES MELLITUS WITH DIABETIC NEUROPATHY, WITHOUT LONG-TERM CURRENT USE OF INSULIN (H): ICD-10-CM

## 2025-07-25 DIAGNOSIS — G47.33 OSA TREATED WITH BIPAP: ICD-10-CM

## 2025-07-25 NOTE — LETTER
7/25/2025      Malina Miller  [address]  [place] [address]        [facility] GERIATRICS  INITIAL VISIT NOTE  July 24, 2025    PRIMARY CARE PROVIDER AND CLINIC: Edwige Avelar [facility] CL[facility]IC [address] [address]    [facility] Medical Record Number: [id number]  Place of Service where encounter took place: [facility] (TCU) [[id number]]    Chief Complaint   Patient presents with    Hospital F/U     [facility] 7/18/2025 - 7/24/2024     HPI:    Malina Miller is a 89 year old (8/10/1932) female was admitted to the above facility from [facility]. Hospital stay 7/18/25 through 7/24/25 where they were admitted for ***. Now admitted to this facility for {FGS ADMISSION REASONS:730815}.      History obtained from: {FGS HPI:618472}.      Brief Hospital Course: ***    TCU Course: ***    CODE STATUS/ADVANCE DIRECTIVES: {CODE STATUS:549859}    ALLERGIES:  Allergies   Allergen Reactions    Metformin Diarrhea    Spironolactone Headache     Hyponatremia       PAST MEDICAL HISTORY:   No past medical history on file.  PAST SURGICAL HISTORY:   No past surgical history on file.  FAMILY HISTORY:   No family history on file.  Unable to review due to cognitive impairment***    SOCIAL HISTORY:   Patient's living condition: lives with spouse    MEDICATIONS  Post Discharge Medication Reconciliation Status: {ACO Med Rec (Provider):324301}.  Current Outpatient Medications   Medication Sig Dispense Refill    acetaminophen (TYLENOL) 500 MG tablet Take 1,000 mg by mouth 3 times daily      amLODIPine (NORVASC) 10 MG tablet Take 1 tablet (10 mg) by mouth at bedtime      atorvastatin (LIPITOR) 10 MG tablet Take 10 mg by mouth daily      carboxymethylcellulose PF (REFRESH PLUS) 0.5 % ophthalmic solution Place 1 drop into both eyes 4 times daily as needed for dry eyes      Folic Acid-Vit B6-Vit B12 2.3-24.5-2 MG TABS Take 1 tablet by mouth daily       levothyroxine (SYNTHROID/LEVOTHROID) 112 MCG tablet Take 112 mcg by mouth daily      melatonin 3 MG tablet Take 3 mg by mouth At Bedtime      pantoprazole (PROTONIX) 40 MG EC tablet Take 40 mg by mouth daily      potassium chloride ER (K-TAB) 20 MEQ CR tablet Take 20 mEq by mouth daily      psyllium (METAMUCIL/KONSYL) 58.6 % powder Take 6 g (1 teaspoonful) by mouth daily      sennosides (SENOKOT) 8.6 MG tablet Take 2 tablets by mouth 2 times daily      sodium chloride 1 GM tablet Take 1 g by mouth 2 times daily       ROS:  10 point ROS neg other than the symptoms noted above in the HPI.***  Unable to obtain due to cognitive impairment or aphasia  ROS    PHYSICAL EXAM:  There were no vitals taken for this visit.  Physical Exam     LABORATORY/IMAGING DATA:  Reviewed as per [facility] and/or [facility]    ASSESSMENT/PLAN:  {FGS DX INITIAL:296882}    Orders:   ***    {FGS TIME SPENT:017685}    Electronically signed by:  Adilia Bolden       Sincerely,        MARISOL Dean CNP    Electronically signed

## 2025-07-31 ENCOUNTER — TRANSITIONAL CARE UNIT VISIT (OUTPATIENT)
Dept: GERIATRICS | Facility: CLINIC | Age: OVER 89
End: 2025-07-31
Payer: MEDICARE

## 2025-07-31 VITALS
DIASTOLIC BLOOD PRESSURE: 77 MMHG | BODY MASS INDEX: 27.79 KG/M2 | HEART RATE: 65 BPM | OXYGEN SATURATION: 98 % | TEMPERATURE: 98.1 F | HEIGHT: 64 IN | WEIGHT: 162.8 LBS | SYSTOLIC BLOOD PRESSURE: 157 MMHG | RESPIRATION RATE: 18 BRPM

## 2025-07-31 DIAGNOSIS — Z91.89 AT RISK FOR MALNUTRITION: Primary | ICD-10-CM

## 2025-07-31 NOTE — PROGRESS NOTES
"[facility] GERIATRIC SERVICE  Episodic/Acute/Follow-Up  [place] MRN: [id number]. Place of Service where encounter took place:  [facility] ([facility]) [[id number]]   Chief Complaint   Patient presents with    RECHECK    HPI: Malina Miller  is a 89 year old (8/10/1932), who is being seen today for an episodic care visit.    [facility] Course:  ***  ***    *** seen today on routine follow up as *** continues to rehab in [facility].     Today, ***    Past Medical and Surgical History reviewed in Epic today.  MEDICATIONS:  Current Outpatient Medications   Medication Sig Dispense Refill    alendronate (FOSAMAX) 70 MG tablet Take 70 mg by mouth every 7 days.      aspirin 81 MG EC tablet Take 81 mg by mouth daily.      atorvastatin (LIPITOR) 10 MG tablet Take 10 mg by mouth daily      cholecalciferol (VITAMIN D3) 125 mcg (5000 units) capsule Take 125 mcg by mouth daily.      docusate sodium (COLACE) 100 MG capsule Take 200 mg by mouth 2 times daily.      furosemide (LASIX) 20 MG tablet Take 10 mg by mouth daily.      levothyroxine (SYNTHROID/LEVOTHROID) 150 MCG tablet Take 150 mcg by mouth every morning (before breakfast).      losartan (COZAAR) 100 MG tablet Take 100 mg by mouth daily.      melatonin 3 MG tablet Take 3 mg by mouth At Bedtime       Objective: BP (!) 157/77   Pulse 65   Temp 98.1  F (36.7  C)   Resp 18   Ht 1.626 m (5' 4\")   Wt 73.8 kg (162 lb 12.8 oz)   SpO2 98%   BMI 27.94 kg/m    Exam:  GENERAL APPEARANCE: Alert, in no distress, cooperative.   RESP: Respiratory effort ***, no respiratory distress, Lung sounds clear.*** On RA. ***  CV: Auscultation of heart reveals S1, S2, rate*** and rhythm***, no*** murmur, no rub or gallop, Edema ***+ BLE. Peripheral pulses are 2+***.  PSYCH: Insight, judgement, and memory are ***, affect and mood are ***.    ASSESSMENT/PLAN:  {fgsdia}  Acute on chronic. ***  Provider reviewed records from facility, and interpreted most recent imaging/lab work (***), and " vital signs, each with their own characteristics requiring MDM.  Provider discussed care with nursing, , and rehab team ***:  ***  ***  ***  ***  ***  ***  ***  ***  Follow up w/in 1 week or as needed.***    Orders:  Clarified diet orders - reg.  Ok for HS snack, malnutrition.     Electronically signed by:  Dr. Emmanuelle Walters, APRN CNP DNP

## 2025-07-31 NOTE — LETTER
7/31/2025      Malina Miller  [address] [address]        No notes on file      Sincerely,        MARISOL Sorenson CNP    Electronically signed

## 2025-08-05 ENCOUNTER — TRANSITIONAL CARE UNIT VISIT (OUTPATIENT)
Dept: GERIATRICS | Facility: CLINIC | Age: OVER 89
End: 2025-08-05
Payer: MEDICARE

## 2025-08-05 ENCOUNTER — LAB REQUISITION (OUTPATIENT)
Dept: LAB | Facility: CLINIC | Age: OVER 89
End: 2025-08-05

## 2025-08-05 VITALS
DIASTOLIC BLOOD PRESSURE: 71 MMHG | SYSTOLIC BLOOD PRESSURE: 187 MMHG | BODY MASS INDEX: 29.06 KG/M2 | RESPIRATION RATE: 18 BRPM | HEART RATE: 64 BPM | WEIGHT: 164 LBS | TEMPERATURE: 97.6 F | HEIGHT: 63 IN | OXYGEN SATURATION: 99 %

## 2025-08-05 DIAGNOSIS — F02.A0 MILD LATE ONSET ALZHEIMER'S DEMENTIA WITHOUT BEHAVIORAL DISTURBANCE, PSYCHOTIC DISTURBANCE, MOOD DISTURBANCE, OR ANXIETY (H): ICD-10-CM

## 2025-08-05 DIAGNOSIS — E87.1 HYPO-OSMOLALITY AND HYPONATREMIA: ICD-10-CM

## 2025-08-05 DIAGNOSIS — S32.040D CLOSED COMPRESSION FRACTURE OF L4 LUMBAR VERTEBRA, WITH ROUTINE HEALING, SUBSEQUENT ENCOUNTER: ICD-10-CM

## 2025-08-05 DIAGNOSIS — G30.1 MILD LATE ONSET ALZHEIMER'S DEMENTIA WITHOUT BEHAVIORAL DISTURBANCE, PSYCHOTIC DISTURBANCE, MOOD DISTURBANCE, OR ANXIETY (H): ICD-10-CM

## 2025-08-05 DIAGNOSIS — G91.2 NPH (NORMAL PRESSURE HYDROCEPHALUS) (H): Primary | ICD-10-CM

## 2025-08-05 DIAGNOSIS — I95.1 ORTHOSTATIC HYPOTENSION: ICD-10-CM

## 2025-08-05 DIAGNOSIS — E87.1 HYPONATREMIA: ICD-10-CM

## 2025-08-05 PROCEDURE — 99309 SBSQ NF CARE MODERATE MDM 30: CPT | Performed by: NURSE PRACTITIONER

## 2025-08-06 LAB
ANION GAP SERPL CALCULATED.3IONS-SCNC: 13 MMOL/L (ref 7–15)
BUN SERPL-MCNC: 14.2 MG/DL (ref 8–23)
CALCIUM SERPL-MCNC: 8.7 MG/DL (ref 8.8–10.4)
CHLORIDE SERPL-SCNC: 100 MMOL/L (ref 98–107)
CREAT SERPL-MCNC: 0.53 MG/DL (ref 0.51–0.95)
EGFRCR SERPLBLD CKD-EPI 2021: 86 ML/MIN/1.73M2
GLUCOSE SERPL-MCNC: 109 MG/DL (ref 70–99)
HCO3 SERPL-SCNC: 20 MMOL/L (ref 22–29)
POTASSIUM SERPL-SCNC: 4.1 MMOL/L (ref 3.4–5.3)
SODIUM SERPL-SCNC: 133 MMOL/L (ref 135–145)

## 2025-08-06 PROCEDURE — P9603 ONE-WAY ALLOW PRORATED MILES: HCPCS | Performed by: NURSE PRACTITIONER

## 2025-08-06 PROCEDURE — 82310 ASSAY OF CALCIUM: CPT | Performed by: NURSE PRACTITIONER

## 2025-08-06 PROCEDURE — 36415 COLL VENOUS BLD VENIPUNCTURE: CPT | Performed by: NURSE PRACTITIONER

## 2025-08-06 PROCEDURE — 80048 BASIC METABOLIC PNL TOTAL CA: CPT | Performed by: NURSE PRACTITIONER

## 2025-08-12 ENCOUNTER — DISCHARGE SUMMARY NURSING HOME (OUTPATIENT)
Dept: GERIATRICS | Facility: CLINIC | Age: OVER 89
End: 2025-08-12
Payer: MEDICARE

## 2025-08-12 VITALS
BODY MASS INDEX: 29.3 KG/M2 | TEMPERATURE: 98.1 F | OXYGEN SATURATION: 98 % | HEART RATE: 68 BPM | DIASTOLIC BLOOD PRESSURE: 72 MMHG | WEIGHT: 165.4 LBS | RESPIRATION RATE: 18 BRPM | SYSTOLIC BLOOD PRESSURE: 149 MMHG | HEIGHT: 63 IN

## 2025-08-12 DIAGNOSIS — G47.33 OSA TREATED WITH BIPAP: ICD-10-CM

## 2025-08-12 DIAGNOSIS — E11.40 TYPE 2 DIABETES MELLITUS WITH DIABETIC NEUROPATHY, WITHOUT LONG-TERM CURRENT USE OF INSULIN (H): ICD-10-CM

## 2025-08-12 DIAGNOSIS — R53.81 PHYSICAL DECONDITIONING: ICD-10-CM

## 2025-08-12 DIAGNOSIS — G30.1 MILD LATE ONSET ALZHEIMER'S DEMENTIA WITHOUT BEHAVIORAL DISTURBANCE, PSYCHOTIC DISTURBANCE, MOOD DISTURBANCE, OR ANXIETY (H): ICD-10-CM

## 2025-08-12 DIAGNOSIS — E87.1 HYPONATREMIA: ICD-10-CM

## 2025-08-12 DIAGNOSIS — E03.9 HYPOTHYROIDISM, UNSPECIFIED TYPE: ICD-10-CM

## 2025-08-12 DIAGNOSIS — S32.040D CLOSED COMPRESSION FRACTURE OF L4 LUMBAR VERTEBRA, WITH ROUTINE HEALING, SUBSEQUENT ENCOUNTER: ICD-10-CM

## 2025-08-12 DIAGNOSIS — K59.01 SLOW TRANSIT CONSTIPATION: ICD-10-CM

## 2025-08-12 DIAGNOSIS — G91.2 NPH (NORMAL PRESSURE HYDROCEPHALUS) (H): Primary | ICD-10-CM

## 2025-08-12 DIAGNOSIS — I95.1 ORTHOSTATIC HYPOTENSION: ICD-10-CM

## 2025-08-12 DIAGNOSIS — F02.A0 MILD LATE ONSET ALZHEIMER'S DEMENTIA WITHOUT BEHAVIORAL DISTURBANCE, PSYCHOTIC DISTURBANCE, MOOD DISTURBANCE, OR ANXIETY (H): ICD-10-CM

## 2025-08-12 DIAGNOSIS — I10 PRIMARY HYPERTENSION: ICD-10-CM

## 2025-08-12 PROCEDURE — 99316 NF DSCHRG MGMT 30 MIN+: CPT | Performed by: NURSE PRACTITIONER
